# Patient Record
Sex: MALE | Race: WHITE | NOT HISPANIC OR LATINO | Employment: FULL TIME | URBAN - METROPOLITAN AREA
[De-identification: names, ages, dates, MRNs, and addresses within clinical notes are randomized per-mention and may not be internally consistent; named-entity substitution may affect disease eponyms.]

---

## 2017-11-29 ENCOUNTER — ALLSCRIPTS OFFICE VISIT (OUTPATIENT)
Dept: OTHER | Facility: OTHER | Age: 57
End: 2017-11-29

## 2017-11-30 NOTE — PROGRESS NOTES
Assessment  1  Basal cell carcinoma of left side of neck (173 41) (C44 41)   2  Seborrheic keratosis (702 19) (L82 1)   3  Screening for skin condition (V82 0) (Z13 89)   4  Multiple melanocytic nevi (216 9) (D22 9)    Plan     · Schedule Surgery Treatment  Procedure  Status: Complete  Done: [id number]    Discussion/Summary  Discussion Summary- [facility]:  Assessment #1: Basal cell constant of the left neck  Care Plan:  Area appears to have reappeared will plan on complete excision in the near future  Assessment #2: Seborrheic keratosis  Care Plan:  Patient reassured these are normal growths we acquire with age no treatment needed  Assessment #3: Nevi  Care Plan:  Reviewed the concept of ABCDE and ugly duckling nothing markedly atypical   Assessment #4: Screening for dermatologic disorders  Care Plan:  Nothing else of concern noted exam sun protection recommended no other suspicious lesions advised patient he should be seen every 6 months with this history of skin cancer  Chief Complaint  Chief Complaint Free Text Note Form: FULL BODY SKIN CANCER EXAM      History of Present Illness  HPI: 72-year-old male who had not seen since last year where we had excised a basal cell carcinoma on his left neck as well as curetted to other lesions on his back  Patient was told that the lesion on the neck was close to the margins a may recur patient notes that the area has been more irritated recently      Review of Systems  Complete [facility]at[facility] [address] [place] [address] [address] Patient:  Constitutional: Denies constitutional symptoms  Eyes: Denies eye symptoms  ENT:  denies ear symptoms, nasal symptoms, mouth or throat symptoms  Cardiovascular: Denies cardiovascular symptoms  Respiratory: Denies respiratory symptoms  Gastrointestinal: Denies gastrointestinal symptoms  Musculoskeletal: Denies musculoskeletal symptoms  Integumentary: Denies skin, hair and nail symptoms  Neurological: Denies neurologic symptoms  Psychiatric: Denies psychiatric symptoms  Endocrine: Denies endocrine symptoms  Hematologic/Lymphatic: Denies hematologic symptoms  Active Problems  1  Basal cell carcinoma of back (173 51) (C44 519)   2  Basal cell carcinoma of left shoulder (173 61) (C44 619)   3  Basal cell carcinoma of left side of neck (173 41) (C44 41)   4  Changing skin lesion (709 9) (L98 9)   5  Multiple melanocytic nevi (216 9) (D22 9)   6  Screening for skin condition (V82 0) (Z13 89)   7  Seborrheic keratosis (702 19) (L82 1)    Past Medical History  Past Medical History Reviewed- Derm:   The past medical history was reviewed  Surgical History  Surgical History Reviewed [facility]- Derm:   Surgical History reviewed      Family History  Family History Reviewed- Derm:   Family History was reviewed      Social History     · Former smoker (M18 15) (R09 210)  Social History Reviewed [facility]- Derm: The social history was reviewed      Current Meds   1  Albuterol Sulfate (2 5 MG/3ML) 0 083% Inhalation Nebulization Solution; Therapy: (Recorded:09Mar2016) to Recorded   2  ProAir  (90 Base) MCG/ACT Inhalation Aerosol Solution; Therapy: (Recorded:09Mar2016) to Recorded  Medication List Reviewed: The medication list was reviewed and updated today  Allergies    1  No Known Drug Allergies    Physical Exam   Constitutional  General appearance: Appears healthy and well developed  Lymphatic  No visible disturbance  Musculoskeletal  Digits and nails: No clubbing, cyanosis or edema  Cutaneous and nail exam normal    Skin  Scalp skin texture and hair distribution: Normal skin texture on scalp, normal hair distribution  Head: Normal turgor, no rashes, no lesions  Neck: Abnormal    Chest: Normal turgor, no rashes, no lesions  Abdomen: Normal turgor, no rashes, no lesions  Back: Normal turgor, no rashes, no lesions  Right upper extremity: Normal turgor, no rashes, no lesions     Left upper extremity: Normal turgor, no rashes, no lesions  Right lower extremity: Normal turgor, no rashes, no lesions  Left lower extremity: Normal turgor, no rashes, no lesions  Neuro/Psych  Alert and oriented x 3  Displays comfort and cooperation during encounterl  Affect is normal    Finding 2 areas are pearly macules noted at the left neck at the excision line of the previous excised basal cell carcinoma normal keratotic papules with greasy stuck on appearance nothing else atypical noted on exam       Future Appointments    Date/Time Provider Specialty Site   12/20/2017 03:00 PM Romayne Clifton, M D   Dermatology [facility]       Signatures   Electronically signed by : SHEKHAR Hurley ; Nov 29 2017  4:48PM EST                       (Author)

## 2017-12-20 ENCOUNTER — LAB REQUISITION (OUTPATIENT)
Dept: LAB | Facility: HOSPITAL | Age: 57
End: 2017-12-20
Payer: COMMERCIAL

## 2017-12-20 ENCOUNTER — ALLSCRIPTS OFFICE VISIT (OUTPATIENT)
Dept: OTHER | Facility: OTHER | Age: 57
End: 2017-12-20

## 2017-12-20 DIAGNOSIS — C44.41 BASAL CELL CARCINOMA OF SKIN OF SCALP AND NECK: ICD-10-CM

## 2017-12-20 PROCEDURE — 88305 TISSUE EXAM BY PATHOLOGIST: CPT | Performed by: DERMATOLOGY

## 2018-01-10 NOTE — RESULT NOTES
Message   apt made     Verified Results  (1) TISSUE EXAM [id number] 12:37PM Miriam Do Order Number: [id number]     Test Name Result Flag Reference   LAB AP CASE REPORT (Report)     Surgical Pathology Report             Case: [id number]                   Authorizing Provider: Ad Arnold MD     Collected:      03/09/2016 1237        Pathologist:      Samual Ormond, MD      Received:      03/10/2016 4999        Specimens:  A) - Skin, Other, left neck                                      B) - Skin, Other, left upper back   LAB AP FINAL DIAGNOSIS (Report)     A  Skin, left neck, shave biopsy:  - Ulcerated basal cell carcinoma, nodular type, extending to the   peripheral border and base of biopsy  B  Skin, left upper back, shave biopsy:  - Basal cell carcinoma, nodular and fibroepithelioma types with   infiltrative features, extending to the    peripheral border and base of biopsy  Interpretation performed at [facility], [address] [place] [address]  LAB AP SURGICAL ADDITIONAL INFORMATION (Report)     These tests were developed and their performance characteristics   determined by [address] [place] [address] Specialty [facility] or [address] [place] [address]  They may not be cleared or approved by the [place]  Food and   Drug Administration  The FDA has determined that such clearance or   approval is not necessary  These tests are used for clinical purposes  They should not be regarded as investigational or for research  This   laboratory has been approved by [place] [address], designated as a high-complexity   laboratory and is qualified to perform these tests  LAB AP GROSS DESCRIPTION (Report)     A  The specimen is received in formalin, labeled with the patient's name   and hospital number, and is designated skin shave left neck  The   specimen consists of a tan superficial shave of skin measuring 1 3 x 0 9 x   0 1 cm   The skin surface is hairbearing and exhibits a   keratotic/hemorrhagic papule measuring 0 9 x 0 7 x 0 1 cm which   multifocally extends to within 1 mm or less of the unoriented peripheral   margin  The margin is inked blue  The skin surface is inked red  The   specimen is trisected lengthwise  Entirely submitted  One cassette  B  The specimen is received in formalin, labeled with the patient's name   and hospital number, and is designated skin shave left upper back  The   specimen consists of a tan superficial shave of skin measuring 0 8 x 0 6 x   0 1 cm  The skin surface appears keratotic  The margin is inked blue  The   skin surface is inked red  The specimen is bisected lengthwise  Entirely   submitted  One cassette  Note: The estimated total formalin fixation time based upon information   provided by the submitting clinician and the standard processing schedule   is over 72 hours   [facility]   LAB AP CLINICAL INFORMATION      TW Order Number: [id number]  R/O BCC

## 2018-01-15 NOTE — RESULT NOTES
Message   biopsy received     Verified Results  (1) TISSUE EXAM 2016 04:49PM Kellie Zavala Order Number: [id number]     Test Name Result Flag Reference   LAB AP CASE REPORT (Report)     Surgical Pathology Report             Case: [id number]                   Authorizing Provider: Shavonne Martínez MD     Collected:      2016 1319        Pathologist:      Nathalie Stroud MD      Received:      04/15/2016 1206        Specimens:  A) - Skin, Other, Left neck                                      B) - Skin, Other, Left back   LAB AP FINAL DIAGNOSIS (Report)     A  Skin, left neck, excision:  - Scar and residual basal cell carcinoma (0 6 cm), nodular type with   superficial/multifocal features,    extending into reticular dermis   - No perineural or lymph-vascular invasion identified   - Resection margins (peripheral and deep) are negative for malignancy but   close  -- Invasive carcinoma within 0 1 mm of one peripheral margin  B  Skin, left back, curetting:  - Basal cell carcinoma with nodular and fibroepithelioma type features,   extending to base of biopsy  Interpretation performed at [facility], [address] [place] [address]  LAB AP SURGICAL ADDITIONAL INFORMATION (Report)     These tests were developed and their performance characteristics   determined by [address] [place] [address] Specialty [facility] or [facility]  They may not be cleared or approved by the [place]  Food and   Drug Administration  The FDA has determined that such clearance or   approval is not necessary  These tests are used for clinical purposes  They should not be regarded as investigational or for research  This   laboratory has been approved by CLIA 88, designated as a high-complexity   laboratory and is qualified to perform these tests  LAB AP GROSS DESCRIPTION (Report)     A  The specimen is received in formalin, labeled with the patient's name   and hospital number, and is designated left neck   The specimen consists   of a rubbery white tan ellipse of skin with attached underlying yellow-tan   tissue together measuring 2 7 x 1 2 x 0 6 cm in greatest dimension  The   skin surface exhibits a vague, white-tan focal papule measuring up to 0 6   cm in greatest dimensions, located 0 1 cm and the nearest peripheral   resected margin  The skin surface tips are inked red and resection margin   blue  Entirely submitted as follows:  1: Opposite tips  2-4: Midportion, sequentially submitted from one tip aspect to opposite   tip aspect  B  The specimen is received in formalin, labeled with the patient's name   and hospital number, and is designated left back  The specimen consists   of a single irregular white-tan tissue fragment measuring 0 5 x 0 25 x   0 15 cm in greatest dimension  The surface exhibits 2 dark brown   crusty-like friable appearing foci measuring from 0 1 up to 0 15 cm in   greatest dimensions, less than 0 1 cm from one another extending to the   peripheral resected surface  The resected margin is inked blue and skin   surface red  Entirely submitted  One cassette  Note: The estimated total formalin fixation time based upon information   provided by the submitting clinician and the standard processing schedule   is over 72 hours  [facility]   LAB AP CLINICAL INFORMATION      TW Order Number: [id number]  A   BCC check margins

## 2018-01-23 NOTE — MISCELLANEOUS
Message  Return to work or school:   Jada Sanchez is under my professional care  He was seen in my office on 12/20/2017   He is able to return to work on  12/21/2017      Weight Bearing Status: No Weight-Bearing  DR THA BAUER        Signatures   Electronically signed by : SHEKHAR Noguera ; Dec 22 2017  7:49AM EST                       (Author)

## 2018-02-26 ENCOUNTER — CLINICAL SUPPORT (OUTPATIENT)
Dept: DERMATOLOGY | Facility: CLINIC | Age: 58
End: 2018-02-26

## 2018-02-26 DIAGNOSIS — C44.41 BASAL CELL CARCINOMA OF LEFT SIDE OF NECK: Primary | ICD-10-CM

## 2018-02-26 PROCEDURE — 99024 POSTOP FOLLOW-UP VISIT: CPT | Performed by: DERMATOLOGY

## 2018-02-26 RX ORDER — ALBUTEROL SULFATE 2.5 MG/3ML
SOLUTION RESPIRATORY (INHALATION)
COMMUNITY

## 2018-02-26 RX ORDER — ALBUTEROL SULFATE 90 UG/1
AEROSOL, METERED RESPIRATORY (INHALATION)
COMMUNITY

## 2018-02-26 NOTE — PROGRESS NOTES
[address] [address] [facility]ERMAT[facility]  [address] [place] [address]     MRN: [id number] : 1960  Encounter: [id number]  Patient Information: Corina Crawford    Subjective:     60-year-old male presents for previously excised basal cell carcinoma left posterior neck with a remnant of a stitch still present     Objective: There were no vitals taken for this visit  Physical Exam:    General Appearance:    Alert, cooperative, no distress   Skin:   Suture noted removed with a  forceps     Assessment:     1  Basal cell carcinoma of left side of neck           Plan:    suture removed follow-up as previously scheduled      Prior to Admission medications    Medication Sig Start Date End Date Taking? Authorizing Provider   albuterol (2 5 mg/3 mL) 0 083 % nebulizer solution Inhale    Historical Provider, MD   albuterol (PROAIR HFA) 90 mcg/act inhaler Inhale    Historical Provider, MD     No Known Allergies    Clarice Alonzo MD  2018,3:19 PM    Portions of the record may have been created with voice recognition software   Occasional wrong word or "sound a like" substitutions may have occurred due to the inherent limitations of voice recognition software   Read the chart carefully and recognize, using context, where substitutions have occurred

## 2021-08-04 ENCOUNTER — OFFICE VISIT (OUTPATIENT)
Dept: URGENT CARE | Facility: CLINIC | Age: 61
End: 2021-08-04
Payer: COMMERCIAL

## 2021-08-04 VITALS
BODY MASS INDEX: 45.43 KG/M2 | DIASTOLIC BLOOD PRESSURE: 86 MMHG | WEIGHT: 315 LBS | SYSTOLIC BLOOD PRESSURE: 148 MMHG | RESPIRATION RATE: 20 BRPM | TEMPERATURE: 98.1 F | OXYGEN SATURATION: 92 % | HEART RATE: 82 BPM

## 2021-08-04 DIAGNOSIS — H60.392 INFECTION OF LEFT EARLOBE: Primary | ICD-10-CM

## 2021-08-04 PROCEDURE — 99213 OFFICE O/P EST LOW 20 MIN: CPT | Performed by: PHYSICIAN ASSISTANT

## 2021-08-04 RX ORDER — CEPHALEXIN 500 MG/1
500 CAPSULE ORAL EVERY 6 HOURS SCHEDULED
Qty: 28 CAPSULE | Refills: 0 | Status: SHIPPED | OUTPATIENT
Start: 2021-08-04 | End: 2021-08-11

## 2021-08-04 RX ORDER — BUDESONIDE AND FORMOTEROL FUMARATE DIHYDRATE 160; 4.5 UG/1; UG/1
2 AEROSOL RESPIRATORY (INHALATION) 2 TIMES DAILY
COMMUNITY
Start: 2021-05-28

## 2021-08-04 NOTE — PATIENT INSTRUCTIONS
Hydration and rest  Tylenol and motrin for pain  Start cephalexin  Do not scratch or pick at wound  Warm compresses  PCP follow up

## 2021-08-04 NOTE — PROGRESS NOTES
[facility]        NAME: Irish Holm is a 62 y o  male  : 1960    MRN: [id number]  DATE: 2021  TIME: 6:39 PM    Assessment and Plan   Infection of left earlobe [H60 392]  1  Infection of left earlobe  cephalexin (KEFLEX) 500 mg capsule         Patient Instructions     Patient Instructions   Hydration and rest  Tylenol and motrin for pain  Start cephalexin  Do not scratch or pick at wound  Warm compresses  PCP follow up  **Portions of the record may have been created with voice recognition software  Occasional wrong word or "sound a like" substitutions may have occurred due to the inherent limitations of voice recognition software  Read the chart carefully and recognize, using context, where substitutions have occurred  **     Chief Complaint     Chief Complaint   Patient presents with    Earache     Pt has 2 scabs on L ear lobe, 1 on outisde of ear and another small on inner ear  Noticed yesterday  States they were bleeding yesterday  History of Present Illness       81-year-old male presents clinic with complaints of left earlobe pain, swelling and bleeding x1 day  Patient has 2 scabs on his ear which she believes is from sunburn  They have become red swollen and tender to the touch  Denies any fever  He reports some drainage from the ear lobe  Review of Systems     Review of Systems   Constitutional: Negative for chills and fever  HENT: Negative for tinnitus  Respiratory: Negative for shortness of breath  Cardiovascular: Negative for chest pain  Musculoskeletal: Negative for myalgias  Skin: Positive for wound  Neurological: Negative for dizziness, weakness and numbness           Current Medications     Current Outpatient Medications:     albuterol (2 5 mg/3 mL) 0 083 % nebulizer solution, Inhale, Disp: , Rfl:     albuterol (PROAIR HFA) 90 mcg/act inhaler, Inhale, Disp: , Rfl:     cephalexin (KEFLEX) 500 mg capsule, Take 1 capsule (500 mg total)

## 2024-01-01 ENCOUNTER — HOSPITAL ENCOUNTER (EMERGENCY)
Facility: HOSPITAL | Age: 64
Discharge: HOME | End: 2024-01-01
Attending: EMERGENCY MEDICINE | Admitting: EMERGENCY MEDICINE
Payer: COMMERCIAL

## 2024-01-01 VITALS
OXYGEN SATURATION: 92 % | RESPIRATION RATE: 18 BRPM | HEART RATE: 98 BPM | TEMPERATURE: 98.2 F | SYSTOLIC BLOOD PRESSURE: 170 MMHG | DIASTOLIC BLOOD PRESSURE: 83 MMHG

## 2024-01-01 DIAGNOSIS — S61.210A LACERATION OF RIGHT INDEX FINGER WITHOUT FOREIGN BODY WITHOUT DAMAGE TO NAIL, INITIAL ENCOUNTER: Primary | ICD-10-CM

## 2024-01-01 PROCEDURE — 90471 IMMUNIZATION ADMIN: CPT

## 2024-01-01 PROCEDURE — 90715 TDAP VACCINE 7 YRS/> IM: CPT | Performed by: EMERGENCY MEDICINE

## 2024-01-01 PROCEDURE — 99284 EMERGENCY DEPT VISIT MOD MDM: CPT | Performed by: EMERGENCY MEDICINE

## 2024-01-01 PROCEDURE — 99282 EMERGENCY DEPT VISIT SF MDM: CPT

## 2024-01-01 PROCEDURE — 12001 RPR S/N/AX/GEN/TRNK 2.5CM/<: CPT | Performed by: EMERGENCY MEDICINE

## 2024-01-01 RX ORDER — LIDOCAINE HYDROCHLORIDE 10 MG/ML
10 INJECTION, SOLUTION EPIDURAL; INFILTRATION; INTRACAUDAL; PERINEURAL ONCE
Status: COMPLETED | OUTPATIENT
Start: 2024-01-01 | End: 2024-01-01

## 2024-01-01 RX ADMIN — LIDOCAINE HYDROCHLORIDE 10 ML: 10 INJECTION, SOLUTION EPIDURAL; INFILTRATION; INTRACAUDAL at 16:52

## 2024-01-01 RX ADMIN — TETANUS TOXOID, REDUCED DIPHTHERIA TOXOID AND ACELLULAR PERTUSSIS VACCINE, ADSORBED 0.5 ML: 5; 2.5; 8; 8; 2.5 SUSPENSION INTRAMUSCULAR at 16:53

## 2024-01-01 NOTE — Clinical Note
Armando Archer was seen and treated in our emergency department on 1/1/2024.                Diagnosis: Finger laceration    Armando  may return to work on return date.    He may return on this date: 01/08/2024         If you have any questions or concerns, please don't hesitate to call.      aMtthias Orantes MD    ______________________________           _______________          _______________  Hospital Representative                              Date                                Time

## 2024-01-01 NOTE — DISCHARGE INSTRUCTIONS
Please follow up PCP.  Recommend returning in 10 to 14 days for suture removal.  Recommend cleaning with soap and water 3 times daily.  Recommend tylenol 650 mg and ibuprofen 600 mg every 6 hours as needed for pain. Please return for severe chest pain, significant shortness of breath, severely worsening symptoms, or any other concerning signs or symptoms. Please refer to the following documents for additional instructions and return precautions.

## 2024-01-02 NOTE — ED PROVIDER NOTES
History  Chief Complaint   Patient presents with    Finger Laceration     Pt cut R index finger with knife while opening a bag of corn, pt states bleeding has slowed with pressure. Pt does take BT, unsure when last tetanus shot was      63-year-old male history of COPD presenting with finger laceration.  Patient reports trying to open a bag of corn when he accidentally cut his right index finger.  Bleeding controlled at this time.  Unsure of last tetanus.  Denies any other pain or injury.  Denies any other complaints.  Chart reviewed.    Past Medical History:  No date: COPD (chronic obstructive pulmonary disease) ([facility])  Family History: non-contributory  Social History          Prior to Admission Medications   Prescriptions Last Dose Informant Patient Reported? Taking?   Symbicort 160-4.5 MCG/ACT inhaler   Yes Yes   Sig: Inhale 2 puffs 2 (two) times a day   albuterol (2.5 mg/3 mL) 0.083 % nebulizer solution   Yes Yes   Sig: Inhale   albuterol (PROAIR HFA) 90 mcg/act inhaler   Yes Yes   Sig: Inhale      Facility-Administered Medications: None       Past Medical History:   Diagnosis Date    COPD (chronic obstructive pulmonary disease) ([facility])        History reviewed. No pertinent surgical history.    History reviewed. No pertinent family history.  I have reviewed and agree with the history as documented.    E-Cigarette/Vaping     E-Cigarette/Vaping Substances     Social History     Tobacco Use    Smoking status: Former    Smokeless tobacco: Never       Review of Systems   Constitutional:  Negative for appetite change, chills, diaphoresis, fever and unexpected weight change.   HENT:  Negative for congestion and rhinorrhea.    Eyes:  Negative for photophobia and visual disturbance.   Respiratory:  Negative for cough, chest tightness and shortness of breath.    Cardiovascular:  Negative for chest pain, palpitations and leg swelling.   Gastrointestinal:  Negative for abdominal distention, abdominal pain, blood in stool,  constipation, diarrhea, nausea and vomiting.   Genitourinary:  Negative for dysuria and hematuria.   Musculoskeletal:  Negative for back pain, joint swelling, neck pain and neck stiffness.   Skin:  Positive for wound. Negative for color change, pallor and rash.   Neurological:  Negative for dizziness, syncope, weakness, light-headedness and headaches.   Psychiatric/Behavioral:  Negative for agitation.    All other systems reviewed and are negative.      Physical Exam  Physical Exam  Vitals and nursing note reviewed.   Constitutional:       General: He is not in acute distress.     Appearance: Normal appearance. He is well-developed. He is not ill-appearing, toxic-appearing or diaphoretic.   HENT:      Head: Normocephalic and atraumatic.      Nose: Nose normal. No congestion or rhinorrhea.      Mouth/Throat:      Mouth: Mucous membranes are moist.      Pharynx: Oropharynx is clear. No oropharyngeal exudate or posterior oropharyngeal erythema.   Eyes:      General: No scleral icterus.        Right eye: No discharge.         Left eye: No discharge.      Extraocular Movements: Extraocular movements intact.      Conjunctiva/sclera: Conjunctivae normal.      Pupils: Pupils are equal, round, and reactive to light.   Neck:      Vascular: No JVD.      Trachea: No tracheal deviation.      Comments: Supple. Normal range of motion.   Cardiovascular:      Rate and Rhythm: Normal rate and regular rhythm.      Heart sounds: Normal heart sounds. No murmur heard.     No friction rub. No gallop.      Comments: Normal rate and regular rhythm  Pulmonary:      Effort: Pulmonary effort is normal. No respiratory distress.      Breath sounds: Normal breath sounds. No stridor. No wheezing or rales.      Comments: Clear to auscultation bilaterally  Chest:      Chest wall: No tenderness.   Abdominal:      General: Bowel sounds are normal. There is no distension.      Palpations: Abdomen is soft.      Tenderness: There is no abdominal  tenderness. There is no right CVA tenderness, left CVA tenderness, guarding or rebound.      Comments: Soft, nontender, nondistended.  Normal bowel sounds throughout   Musculoskeletal:         General: No swelling, tenderness, deformity or signs of injury. Normal range of motion.      Cervical back: Normal range of motion and neck supple. No rigidity. No muscular tenderness.      Right lower leg: No edema.      Left lower leg: No edema.   Lymphadenopathy:      Cervical: No cervical adenopathy.   Skin:     General: Skin is warm and dry.      Coloration: Skin is not pale.      Findings: Lesion present. No erythema or rash.      Comments: Approximately 1.5 cm laceration to right index finger with gapping.  Sensation intact.  Motor intact.  Normal cap refill   Neurological:      General: No focal deficit present.      Mental Status: He is alert. Mental status is at baseline.      Sensory: No sensory deficit.      Motor: No weakness or abnormal muscle tone.      Coordination: Coordination normal.      Gait: Gait normal.      Comments: Alert.  Strength and sensation grossly intact.  Ambulatory without difficulty at baseline.    Psychiatric:         Behavior: Behavior normal.         Thought Content: Thought content normal.         Vital Signs  ED Triage Vitals [01/01/24 1605]   Temperature Pulse Respirations Blood Pressure SpO2   98.2 °F (36.8 °C) 98 18 170/83 92 %      Temp Source Heart Rate Source Patient Position - Orthostatic VS BP Location FiO2 (%)   Tympanic Monitor Sitting Left arm --      Pain Score       --           Vitals:    01/01/24 1605   BP: 170/83   Pulse: 98   Patient Position - Orthostatic VS: Sitting         Visual Acuity      ED Medications  Medications   lidocaine (PF) (XYLOCAINE-MPF) 1 % injection 10 mL (10 mL Infiltration Given 1/1/24 1652)   tetanus-diphtheria-acellular pertussis (BOOSTRIX) IM injection 0.5 mL (0.5 mL Intramuscular Given 1/1/24 1653)       Diagnostic Studies  Results Reviewed        "None                   No orders to display              Procedures  Digital Block    Date/Time: 1/1/2024 5:15 PM    Performed by: Matthias Orantes MD  Authorized by: Matthias Orantes MD    Consent:     Consent obtained:  Verbal    Consent given by:  Patient    Risks discussed:  Allergic reaction, bleeding, intravascular injection, infection, nerve damage, pain, unsuccessful block and swelling    Alternatives discussed:  No treatment  Indications:     Indications:  Procedural anesthesia  Location:     Block location:  Finger    Finger blocked:  R index finger  Pre-procedure details:     Neurovascular status: intact      Skin preparation:  Alcohol  Procedure details (see MAR for exact dosages):     Needle gauge:  27 G    Anesthetic injected:  Lidocaine 1% w/o epi    Technique:  Four-sided ring block    Injection procedure:  Anatomic landmarks identified, anatomic landmarks palpated, introduced needle, incremental injection and negative aspiration for blood  Post-procedure details:     Outcome:  Anesthesia achieved    Patient tolerance of procedure:  Tolerated well, no immediate complications  Universal Protocol:  Consent: Verbal consent obtained.  Risks and benefits: risks, benefits and alternatives were discussed  Consent given by: patient  Time out: Immediately prior to procedure a \"time out\" was called to verify the correct patient, procedure, equipment, support staff and site/side marked as required.  Timeout called at: 1/1/2024 5:15 PM.  Patient understanding: patient states understanding of the procedure being performed  Patient consent: the patient's understanding of the procedure matches consent given  Procedure consent: procedure consent matches procedure scheduled  Relevant documents: relevant documents present and verified  Test results: test results available and properly labeled  Site marked: the operative site was marked  Radiology Images displayed and confirmed. If images not available, report reviewed: " imaging studies available  Required items: required blood products, implants, devices, and special equipment available  Patient identity confirmed: verbally with patient and arm band  Laceration repair    Date/Time: 1/1/2024 5:15 PM    Performed by: Matthias Orantes MD  Authorized by: Matthias Orantes MD  Body area: upper extremity  Location details: right index finger  Laceration length: 1.5 cm  Tendon involvement: none  Nerve involvement: none  Vascular damage: no  Anesthesia: digital block    Anesthesia:  Local Anesthetic: lidocaine 1% without epinephrine  Anesthetic total: 10 mL    Sedation:  Patient sedated: no      Wound Dehiscence:  Superficial Wound Dehiscence: simple closure      Procedure Details:  Preparation: Patient was prepped and draped in the usual sterile fashion.  Irrigation solution: saline  Irrigation method: jet lavage  Amount of cleaning: standard  Debridement: none  Degree of undermining: none  Wound skin closure material used: 5-0 ethilon.  Number of sutures: 4  Technique: simple  Approximation: close  Approximation difficulty: simple  Dressing: 4x4 sterile gauze and gauze roll  Patient tolerance: patient tolerated the procedure well with no immediate complications               ED Course                               SBIRT 22yo+      Flowsheet Row Most Recent Value   Initial Alcohol Screen: US AUDIT-C     1. How often do you have a drink containing alcohol? 0 Filed at: 01/01/2024 1608   2. How many drinks containing alcohol do you have on a typical day you are drinking?  0 Filed at: 01/01/2024 1608   3a. Male UNDER 65: How often do you have five or more drinks on one occasion? 0 Filed at: 01/01/2024 1608   3b. FEMALE Any Age, or MALE 65+: How often do you have 4 or more drinks on one occassion? 0 Filed at: 01/01/2024 1608   Audit-C Score 0 Filed at: 01/01/2024 1608   ZAHRA: How many times in the past year have you...    Used an illegal drug or used a prescription medication for non-medical reasons?  Never Filed at: 01/01/2024 1608                      Medical Decision Making  63-year-old male history of COPD presenting with finger laceration.  Gapping finger laceration.  Neurovascular intact.  Plan for repair.  Digital block with anesthesia achieved.  Wound irrigated copiously.  Repaired with 4 sutures.  Suture precautions. Discussed results and recommendations. Advised follow up PCP. Medication recommendations. Given instructions and return precautions. Patient/family at bedside acknowledged understanding of all written and verbal instructions and return precautions. Discharged.     Risk  Prescription drug management.             Disposition  Final diagnoses:   Laceration of right index finger without foreign body without damage to nail, initial encounter     Time reflects when diagnosis was documented in both MDM as applicable and the Disposition within this note       Time User Action Codes Description Comment    1/1/2024  5:37 PM Matthias Orantes Add [S61.210A] Laceration of right index finger without foreign body without damage to nail, initial encounter           ED Disposition       ED Disposition   Discharge    Condition   Stable    Date/Time   Mon Jan 1, 2024 5987    Comment   Armando Archer discharge to home/self care.                   Follow-up Information       Follow up With Specialties Details Why Contact Info    Randal Mari MD Internal Medicine Schedule an appointment as soon as possible for a visit in 1 week  [address] [place] [id number]  [phone]              Discharge Medication List as of 1/1/2024  5:38 PM        CONTINUE these medications which have NOT CHANGED    Details   albuterol (2.5 mg/3 mL) 0.083 % nebulizer solution Inhale, Historical Med      albuterol (PROAIR HFA) 90 mcg/act inhaler Inhale, Historical Med      Symbicort 160-4.5 MCG/ACT inhaler Inhale 2 puffs 2 (two) times a day, Starting Fri 5/28/2021, Historical Med             No discharge  procedures on file.    PDMP Review       None            ED Provider  Electronically Signed by             Matthias Orantes MD  01/01/24 5752

## 2025-02-19 ENCOUNTER — APPOINTMENT (EMERGENCY)
Dept: RADIOLOGY | Facility: HOSPITAL | Age: 65
DRG: 191 | End: 2025-02-19
Payer: COMMERCIAL

## 2025-02-19 ENCOUNTER — HOSPITAL ENCOUNTER (INPATIENT)
Facility: HOSPITAL | Age: 65
LOS: 3 days | Discharge: HOME | DRG: 191 | End: 2025-02-24
Attending: STUDENT IN AN ORGANIZED HEALTH CARE EDUCATION/TRAINING PROGRAM | Admitting: INTERNAL MEDICINE
Payer: COMMERCIAL

## 2025-02-19 ENCOUNTER — APPOINTMENT (EMERGENCY)
Dept: CT IMAGING | Facility: HOSPITAL | Age: 65
DRG: 191 | End: 2025-02-19
Payer: COMMERCIAL

## 2025-02-19 DIAGNOSIS — I42.9 CARDIOMYOPATHY (HCC): ICD-10-CM

## 2025-02-19 DIAGNOSIS — J06.9 URI (UPPER RESPIRATORY INFECTION): ICD-10-CM

## 2025-02-19 DIAGNOSIS — R06.02 SHORTNESS OF BREATH: ICD-10-CM

## 2025-02-19 DIAGNOSIS — R79.89 ELEVATED TROPONIN: ICD-10-CM

## 2025-02-19 DIAGNOSIS — J44.1 COPD EXACERBATION (HCC): Primary | ICD-10-CM

## 2025-02-19 DIAGNOSIS — I42.9 CARDIOMYOPATHY, UNSPECIFIED TYPE (HCC): ICD-10-CM

## 2025-02-19 LAB
2HR DELTA HS TROPONIN: 7 NG/L
4HR DELTA HS TROPONIN: 12 NG/L
ALBUMIN SERPL BCG-MCNC: 4.2 G/DL (ref 3.5–5)
ALP SERPL-CCNC: 55 U/L (ref 34–104)
ALT SERPL W P-5'-P-CCNC: 52 U/L (ref 7–52)
ANION GAP SERPL CALCULATED.3IONS-SCNC: 7 MMOL/L (ref 4–13)
AST SERPL W P-5'-P-CCNC: 33 U/L (ref 13–39)
BASOPHILS # BLD AUTO: 0.05 THOUSANDS/ÂΜL (ref 0–0.1)
BASOPHILS NFR BLD AUTO: 0 % (ref 0–1)
BILIRUB SERPL-MCNC: 0.37 MG/DL (ref 0.2–1)
BNP SERPL-MCNC: 112 PG/ML (ref 0–100)
BUN SERPL-MCNC: 23 MG/DL (ref 5–25)
CALCIUM SERPL-MCNC: 9 MG/DL (ref 8.4–10.2)
CARDIAC TROPONIN I PNL SERPL HS: 52 NG/L (ref ?–50)
CARDIAC TROPONIN I PNL SERPL HS: 59 NG/L (ref ?–50)
CARDIAC TROPONIN I PNL SERPL HS: 64 NG/L (ref ?–50)
CHLORIDE SERPL-SCNC: 105 MMOL/L (ref 96–108)
CO2 SERPL-SCNC: 27 MMOL/L (ref 21–32)
CREAT SERPL-MCNC: 0.78 MG/DL (ref 0.6–1.3)
EOSINOPHIL # BLD AUTO: 0.07 THOUSAND/ÂΜL (ref 0–0.61)
EOSINOPHIL NFR BLD AUTO: 1 % (ref 0–6)
ERYTHROCYTE [DISTWIDTH] IN BLOOD BY AUTOMATED COUNT: 15 % (ref 11.6–15.1)
GFR SERPL CREATININE-BSD FRML MDRD: 95 ML/MIN/1.73SQ M
GLUCOSE SERPL-MCNC: 145 MG/DL (ref 65–140)
HCT VFR BLD AUTO: 44.3 % (ref 36.5–49.3)
HGB BLD-MCNC: 14.4 G/DL (ref 12–17)
IMM GRANULOCYTES # BLD AUTO: 0.07 THOUSAND/UL (ref 0–0.2)
IMM GRANULOCYTES NFR BLD AUTO: 1 % (ref 0–2)
LYMPHOCYTES # BLD AUTO: 0.99 THOUSANDS/ÂΜL (ref 0.6–4.47)
LYMPHOCYTES NFR BLD AUTO: 9 % (ref 14–44)
MCH RBC QN AUTO: 29.4 PG (ref 26.8–34.3)
MCHC RBC AUTO-ENTMCNC: 32.5 G/DL (ref 31.4–37.4)
MCV RBC AUTO: 90 FL (ref 82–98)
MONOCYTES # BLD AUTO: 0.73 THOUSAND/ÂΜL (ref 0.17–1.22)
MONOCYTES NFR BLD AUTO: 6 % (ref 4–12)
NEUTROPHILS # BLD AUTO: 9.77 THOUSANDS/ÂΜL (ref 1.85–7.62)
NEUTS SEG NFR BLD AUTO: 83 % (ref 43–75)
NRBC BLD AUTO-RTO: 0 /100 WBCS
PLATELET # BLD AUTO: 165 THOUSANDS/UL (ref 149–390)
PMV BLD AUTO: 9.8 FL (ref 8.9–12.7)
POTASSIUM SERPL-SCNC: 4.1 MMOL/L (ref 3.5–5.3)
PROT SERPL-MCNC: 6.9 G/DL (ref 6.4–8.4)
RBC # BLD AUTO: 4.9 MILLION/UL (ref 3.88–5.62)
SODIUM SERPL-SCNC: 139 MMOL/L (ref 135–147)
WBC # BLD AUTO: 11.68 THOUSAND/UL (ref 4.31–10.16)

## 2025-02-19 PROCEDURE — 93005 ELECTROCARDIOGRAM TRACING: CPT

## 2025-02-19 PROCEDURE — 71045 X-RAY EXAM CHEST 1 VIEW: CPT

## 2025-02-19 PROCEDURE — 36415 COLL VENOUS BLD VENIPUNCTURE: CPT | Performed by: STUDENT IN AN ORGANIZED HEALTH CARE EDUCATION/TRAINING PROGRAM

## 2025-02-19 PROCEDURE — 94640 AIRWAY INHALATION TREATMENT: CPT

## 2025-02-19 PROCEDURE — 99285 EMERGENCY DEPT VISIT HI MDM: CPT

## 2025-02-19 PROCEDURE — 96374 THER/PROPH/DIAG INJ IV PUSH: CPT

## 2025-02-19 PROCEDURE — 99285 EMERGENCY DEPT VISIT HI MDM: CPT | Performed by: STUDENT IN AN ORGANIZED HEALTH CARE EDUCATION/TRAINING PROGRAM

## 2025-02-19 PROCEDURE — 83880 ASSAY OF NATRIURETIC PEPTIDE: CPT | Performed by: STUDENT IN AN ORGANIZED HEALTH CARE EDUCATION/TRAINING PROGRAM

## 2025-02-19 PROCEDURE — 85025 COMPLETE CBC W/AUTO DIFF WBC: CPT | Performed by: STUDENT IN AN ORGANIZED HEALTH CARE EDUCATION/TRAINING PROGRAM

## 2025-02-19 PROCEDURE — 71275 CT ANGIOGRAPHY CHEST: CPT

## 2025-02-19 PROCEDURE — 80053 COMPREHEN METABOLIC PANEL: CPT | Performed by: STUDENT IN AN ORGANIZED HEALTH CARE EDUCATION/TRAINING PROGRAM

## 2025-02-19 PROCEDURE — 84484 ASSAY OF TROPONIN QUANT: CPT | Performed by: STUDENT IN AN ORGANIZED HEALTH CARE EDUCATION/TRAINING PROGRAM

## 2025-02-19 RX ORDER — IPRATROPIUM BROMIDE AND ALBUTEROL SULFATE 2.5; .5 MG/3ML; MG/3ML
3 SOLUTION RESPIRATORY (INHALATION) ONCE
Status: COMPLETED | OUTPATIENT
Start: 2025-02-19 | End: 2025-02-19

## 2025-02-19 RX ORDER — ACETAMINOPHEN 325 MG/1
650 TABLET ORAL EVERY 6 HOURS PRN
Status: DISCONTINUED | OUTPATIENT
Start: 2025-02-19 | End: 2025-02-24 | Stop reason: HOSPADM

## 2025-02-19 RX ORDER — HYDRALAZINE HYDROCHLORIDE 10 MG/1
10 TABLET, FILM COATED ORAL ONCE
Status: COMPLETED | OUTPATIENT
Start: 2025-02-19 | End: 2025-02-19

## 2025-02-19 RX ORDER — SODIUM CHLORIDE FOR INHALATION 3 %
4 VIAL, NEBULIZER (ML) INHALATION ONCE
Status: COMPLETED | OUTPATIENT
Start: 2025-02-19 | End: 2025-02-19

## 2025-02-19 RX ORDER — METHYLPREDNISOLONE SODIUM SUCCINATE 125 MG/2ML
80 INJECTION, POWDER, LYOPHILIZED, FOR SOLUTION INTRAMUSCULAR; INTRAVENOUS ONCE
Status: COMPLETED | OUTPATIENT
Start: 2025-02-19 | End: 2025-02-19

## 2025-02-19 RX ORDER — ASPIRIN 325 MG
325 TABLET ORAL ONCE
Status: COMPLETED | OUTPATIENT
Start: 2025-02-19 | End: 2025-02-19

## 2025-02-19 RX ADMIN — IOHEXOL 100 ML: 350 INJECTION, SOLUTION INTRAVENOUS at 18:13

## 2025-02-19 RX ADMIN — IPRATROPIUM BROMIDE AND ALBUTEROL SULFATE 3 ML: 2.5; .5 SOLUTION RESPIRATORY (INHALATION) at 16:55

## 2025-02-19 RX ADMIN — IPRATROPIUM BROMIDE AND ALBUTEROL SULFATE 3 ML: 2.5; .5 SOLUTION RESPIRATORY (INHALATION) at 21:05

## 2025-02-19 RX ADMIN — METHYLPREDNISOLONE SODIUM SUCCINATE 80 MG: 125 INJECTION, POWDER, FOR SOLUTION INTRAMUSCULAR; INTRAVENOUS at 20:35

## 2025-02-19 RX ADMIN — HYDRALAZINE HYDROCHLORIDE 10 MG: 10 TABLET ORAL at 23:57

## 2025-02-19 RX ADMIN — SODIUM CHLORIDE SOLN NEBU 3% 4 ML: 3 NEBU SOLN at 16:55

## 2025-02-19 RX ADMIN — ASPIRIN 325 MG ORAL TABLET 325 MG: 325 PILL ORAL at 20:34

## 2025-02-19 RX ADMIN — SODIUM CHLORIDE SOLN NEBU 3% 4 ML: 3 NEBU SOLN at 21:05

## 2025-02-19 NOTE — ED PROVIDER NOTES
ED Disposition       None          Assessment & Plan   {Hyperlinks  Risk Stratification - NIHSS - HEART SCORE - Fill out sepsis note and make sure you call [id number] if severe or septic shock:[id number]}    Medical Decision Making  Amount and/or Complexity of Data Reviewed  Labs: ordered.  Radiology: ordered.    Risk  Prescription drug management.             Medications   ipratropium-albuterol (DUO-NEB) 0.5-2.5 mg/3 mL inhalation solution 3 mL (has no administration in time range)   sodium chloride 3 % inhalation solution 4 mL (has no administration in time range)       ED Risk Strat Scores                                                History of Present Illness   {Hyperlinks  History (Med, Surg, Fam, Social) - Current Medications - Allergies  :[id number]}    Chief Complaint   Patient presents with    Shortness of Breath     Hx copd, uses nebs at home, dyspnea with exertion.          Past Medical History:   Diagnosis Date    COPD (chronic obstructive pulmonary disease) (HCC)       No past surgical history on file.   No family history on file.   Social History     Tobacco Use    Smoking status: Former    Smokeless tobacco: Never      E-Cigarette/Vaping      E-Cigarette/Vaping Substances      I have reviewed and agree with the history as documented.     HPI    Patient is a 65-year-old male presenting to the emergency department for shortness of breath and difficulty breathing.  Patient said he is on a second round of antibiotics treating for a COPD exacerbation.  Reports no fevers or chills.  Does report persistent coughing and inability to cough up with concern of mucous plugging.  He has been using his nebs as prescribed.  No noted leg swelling.  No current chest discomfort.  Reports difficulty ambulating secondary to shortness of breath.        Review of Systems   Constitutional:  Negative for chills and fever.   HENT:  Negative for ear pain and sore throat.    Eyes:  Negative for pain and visual disturbance.    Respiratory:  Positive for cough and shortness of breath.    Cardiovascular:  Negative for chest pain and palpitations.   Gastrointestinal:  Negative for abdominal pain and vomiting.   Genitourinary:  Negative for dysuria and hematuria.   Musculoskeletal:  Negative for arthralgias and back pain.   Skin:  Negative for color change and rash.   Neurological:  Negative for seizures and syncope.   All other systems reviewed and are negative.          Objective   {Hyperlinks  Historical Vitals - Historical Labs - Chart Review/Microbiology - Last Echo - Code Status  :[id number]}    ED Triage Vitals [02/19/25 1601]   Temperature Pulse Blood Pressure Respirations SpO2 Patient Position - Orthostatic VS   98.2 °F (36.8 °C) (!) 118 (!) 196/91 18 93 % Sitting      Temp Source Heart Rate Source BP Location FiO2 (%) Pain Score    Temporal Monitor Left arm -- --      Vitals      Date and Time Temp Pulse SpO2 Resp BP Pain Score FACES Pain Rating User   02/19/25 1601 98.2 °F (36.8 °C) 118 93 % 18 196/91 -- -- KW            Physical Exam  Vitals and nursing note reviewed.   Constitutional:       General: He is not in acute distress.     Appearance: He is well-developed.   HENT:      Head: Normocephalic and atraumatic.   Eyes:      Conjunctiva/sclera: Conjunctivae normal.   Cardiovascular:      Rate and Rhythm: Regular rhythm. Tachycardia present.      Heart sounds: No murmur heard.  Pulmonary:      Effort: Pulmonary effort is normal. No respiratory distress.      Breath sounds: Wheezing present.   Abdominal:      Palpations: Abdomen is soft.      Tenderness: There is no abdominal tenderness.   Musculoskeletal:         General: No swelling.      Cervical back: Neck supple.   Skin:     General: Skin is warm and dry.      Capillary Refill: Capillary refill takes less than 2 seconds.   Neurological:      General: No focal deficit present.      Mental Status: He is alert and oriented to person, place, and time.   Psychiatric:          Mood and Affect: Mood normal.         Results Reviewed       Procedure Component Value Units Date/Time    CBC and differential [[id number]]     Lab Status: No result Specimen: Blood     Comprehensive metabolic panel [[id number]]     Lab Status: No result Specimen: Blood     B-Type Natriuretic Peptide(BNP) [[id number]]     Lab Status: No result Specimen: Blood     HS Troponin 0hr (reflex protocol) [[id number]]     Lab Status: No result Specimen: Blood             XR chest 1 view portable    (Results Pending)       Procedures    ED Medication and Procedure Management   Prior to Admission Medications   Prescriptions Last Dose Informant Patient Reported? Taking?   Symbicort 160-4.5 MCG/ACT inhaler   Yes No   Sig: Inhale 2 puffs 2 (two) times a day   albuterol (2.5 mg/3 mL) 0.083 % nebulizer solution   Yes No   Sig: Inhale   albuterol (PROAIR HFA) 90 mcg/act inhaler   Yes No   Sig: Inhale      Facility-Administered Medications: None     Patient's Medications   Discharge Prescriptions    No medications on file     No discharge procedures on file.  ED SEPSIS DOCUMENTATION            02/23/25 1952 97.5 °F (36.4 °C) 86 91 % -- 163/97 -- -- DII   02/23/25 1518 -- -- -- 18 -- -- -- BL   02/23/25 1518 97.2 °F (36.2 °C) 90 92 % -- 157/90 -- -- DII   02/23/25 1333 -- 91 92 % -- -- -- -- ES   02/23/25 1014 -- -- -- -- -- No Pain -- EO   02/23/25 0747 -- 79 94 % -- -- -- -- ES   02/23/25 0728 -- 85 92 % 16 -- -- -- ES   02/23/25 0714 97.5 °F (36.4 °C) -- -- 16 157/91 -- -- DII   02/23/25 0713 97.5 °F (36.4 °C) 85 92 % 16 157/91 -- -- DII   02/23/25 0236 97.2 °F (36.2 °C) 85 92 % -- 162/91 -- -- DII   02/22/25 2300 -- -- -- 19 -- -- -- NB   02/22/25 2300 97.7 °F (36.5 °C) 81 92 % -- 133/73 -- -- DII   02/22/25 1957 -- -- -- -- -- No Pain -- NB   02/22/25 1957 -- 93 94 % -- 155/84 -- -- DII   02/22/25 1938 -- -- 93 % -- -- -- -- CB   02/22/25 1433 97.5 °F (36.4 °C) 98 91 % 16 151/84 -- -- DII   02/22/25 1345 -- 104 93 % -- -- -- -- ES   02/22/25 1327 -- 101 92 % 16 -- -- -- ES   02/22/25 1039 -- -- -- -- -- No Pain -- EO   02/22/25 0747 -- 89 93 % -- -- -- -- ES   02/22/25 0725 -- 97 92 % 16 -- -- -- ES   02/22/25 0724 97.4 °F (36.3 °C) 81 94 % 16 153/87 -- -- DII   02/21/25 2257 -- -- -- -- -- No Pain -- NB   02/21/25 2257 97.3 °F (36.3 °C) 77 95 % -- 154/87 -- -- DII   02/21/25 1952 -- -- 94 % -- -- -- -- CB   02/21/25 1946 97.3 °F (36.3 °C) -- -- -- 166/96 -- -- DII   02/21/25 1542 97.4 °F (36.3 °C) 97 93 % -- 165/96 -- -- DII   02/21/25 1435 -- 83 -- -- 141/79 -- -- AK   02/21/25 1325 -- -- 94 % -- -- -- -- CW   02/21/25 1100 -- -- 94 % -- -- No Pain -- AS   02/21/25 0735 97.8 °F (36.6 °C) 83 93 % 18 141/79 -- -- DII   02/21/25 0719 -- -- 93 % -- -- -- -- CW   02/21/25 0456 -- 89 95 % -- 135/79 -- -- DII   02/21/25 0330 97.5 °F (36.4 °C) 90 93 % 18 164/86 -- -- DII   02/21/25 0107 -- 89 93 % -- 166/84 -- -- DII   02/20/25 2234 97.9 °F (36.6 °C) 98 93 % 18 169/101 -- -- DII   02/20/25 1930 -- -- -- -- -- No Pain -- BG   02/20/25 1858 97.9 °F (36.6 °C) 101 91 % 18 154/83 -- -- DII   02/20/25 1818 --  -- 93 % -- -- -- -- CW   02/20/25 1457 97.6 °F (36.4 °C) 93 93 % 18 158/87 -- -- DII   02/20/25 1333 -- -- 92 % -- -- -- -- CW   02/20/25 1000 -- -- -- -- -- No Pain -- MS   02/20/25 0745 97.6 °F (36.4 °C) 84 92 % 19 163/88 -- -- DII   02/20/25 0718 -- -- 94 % -- -- -- -- CW   02/20/25 0325 97.8 °F (36.6 °C) 93 93 % 20 168/93 -- -- DII   02/19/25 2352 -- 93 94 % -- 172/94 -- -- DII   02/19/25 2330 -- -- -- -- -- No Pain -- BG   02/19/25 2228 -- -- -- -- -- No Pain -- BG   02/19/25 2228 98.4 °F (36.9 °C) 92 94 % 18 180/98 -- -- DII   02/19/25 2141 -- -- -- -- -- 4 -- BS   02/19/25 2115 -- 91 95 % 20 188/95 -- -- BS   02/19/25 2000 -- 92 93 % 20 185/90 -- -- BS   02/19/25 1900 -- 96 94 % 20 173/67 -- -- BS   02/19/25 1815 -- 92 96 % 20 172/83 -- -- BS   02/19/25 1745 -- 91 94 % 19 153/72 -- -- SG   02/19/25 1601 98.2 °F (36.8 °C) 118 93 % 18 196/91 -- -- KW            Physical Exam  Vitals and nursing note reviewed.   Constitutional:       General: He is not in acute distress.     Appearance: He is well-developed.   HENT:      Head: Normocephalic and atraumatic.   Eyes:      Conjunctiva/sclera: Conjunctivae normal.   Cardiovascular:      Rate and Rhythm: Regular rhythm. Tachycardia present.      Heart sounds: No murmur heard.  Pulmonary:      Effort: Pulmonary effort is normal. No respiratory distress.      Breath sounds: Wheezing present.   Abdominal:      Palpations: Abdomen is soft.      Tenderness: There is no abdominal tenderness.   Musculoskeletal:         General: No swelling.      Cervical back: Neck supple.   Skin:     General: Skin is warm and dry.      Capillary Refill: Capillary refill takes less than 2 seconds.   Neurological:      General: No focal deficit present.      Mental Status: He is alert and oriented to person, place, and time.   Psychiatric:         Mood and Affect: Mood normal.         Results Reviewed       Procedure Component Value Units Date/Time    HS Troponin I 4hr [[id number]]  (Abnormal)  Collected: 02/19/25 2105    Lab Status: Final result Specimen: Blood from Arm, Right Updated: 02/19/25 2154     hs TnI 4hr 64 ng/L      Delta 4hr hsTnI 12 ng/L     HS Troponin I 2hr [[id number]]  (Abnormal) Collected: 02/19/25 1900    Lab Status: Final result Specimen: Blood from Arm, Right Updated: 02/19/25 1935     hs TnI 2hr 59 ng/L      Delta 2hr hsTnI 7 ng/L     HS Troponin 0hr (reflex protocol) [[id number]]  (Abnormal) Collected: 02/19/25 1655    Lab Status: Final result Specimen: Blood from Arm, Right Updated: 02/19/25 1742     hs TnI 0hr 52 ng/L     B-Type Natriuretic Peptide(BNP) [[id number]]  (Abnormal) Collected: 02/19/25 1655    Lab Status: Final result Specimen: Blood from Arm, Right Updated: 02/19/25 1731      pg/mL     Comprehensive metabolic panel [[id number]]  (Abnormal) Collected: 02/19/25 1655    Lab Status: Final result Specimen: Blood from Arm, Right Updated: 02/19/25 1723     Sodium 139 mmol/L      Potassium 4.1 mmol/L      Chloride 105 mmol/L      CO2 27 mmol/L      ANION GAP 7 mmol/L      BUN 23 mg/dL      Creatinine 0.78 mg/dL      Glucose 145 mg/dL      Calcium 9.0 mg/dL      AST 33 U/L      ALT 52 U/L      Alkaline Phosphatase 55 U/L      Total Protein 6.9 g/dL      Albumin 4.2 g/dL      Total Bilirubin 0.37 mg/dL      eGFR 95 ml/min/1.73sq m     Narrative:      [employer] guidelines for Chronic Kidney Disease (CKD):     Stage 1 with normal or high GFR (GFR > 90 mL/min/1.73 square meters)    Stage 2 Mild CKD (GFR = 60-89 mL/min/1.73 square meters)    Stage 3A Moderate CKD (GFR = 45-59 mL/min/1.73 square meters)    Stage 3B Moderate CKD (GFR = 30-44 mL/min/1.73 square meters)    Stage 4 Severe CKD (GFR = 15-29 mL/min/1.73 square meters)    Stage 5 End Stage CKD (GFR <15 mL/min/1.73 square meters)  Note: GFR calculation is accurate only with a steady state creatinine    CBC and differential [[id number]]  (Abnormal) Collected: 02/19/25 3679    Lab Status: Final  result Specimen: Blood from Arm, Right Updated: 02/19/25 1709     WBC 11.68 Thousand/uL      RBC 4.90 Million/uL      Hemoglobin 14.4 g/dL      Hematocrit 44.3 %      MCV 90 fL      MCH 29.4 pg      MCHC 32.5 g/dL      RDW 15.0 %      MPV 9.8 fL      Platelets 165 Thousands/uL      nRBC 0 /100 WBCs      Segmented % 83 %      Immature Grans % 1 %      Lymphocytes % 9 %      Monocytes % 6 %      Eosinophils Relative 1 %      Basophils Relative 0 %      Absolute Neutrophils 9.77 Thousands/µL      Absolute Immature Grans 0.07 Thousand/uL      Absolute Lymphocytes 0.99 Thousands/µL      Absolute Monocytes 0.73 Thousand/µL      Eosinophils Absolute 0.07 Thousand/µL      Basophils Absolute 0.05 Thousands/µL             CTA chest pe study   Final Interpretation by Angel Barraza MD (02/19 1859)      Limited CTA chest demonstrates no intraluminal filling defect to suggest an acute pulmonary embolus. However, evaluation for small distal subsegmental emboli was limited.      Poor inspiratory effort with scattered bilateral atelectatic lung changes. A few tree-in-bud opacities are noted at the bilateral lung bases with mild bronchial wall thickening suspicious for an infectious or inflammatory bronchiolitis.      No pleural effusion or pneumothorax.      Mild emphysematous lung changes.      Mild right hilar lymphadenopathy likely reactive. Consider a repeat CT chest in 2 to 3 months to assess for stability or improvement.      Workstation performed: [id number]         XR chest 1 view portable   Final Interpretation by Warren Herron MD (02/20 0943)      Mild prominence of the heart. Clear lungs            Workstation performed: [id number]             Procedures    ED Medication and Procedure Management   Prior to Admission Medications   Prescriptions Last Dose Informant Patient Reported? Taking?   Symbicort 160-4.5 MCG/ACT inhaler   Yes No   Sig: Inhale 2 puffs 2 (two) times a day   albuterol (2.5 mg/3 mL) 0.083 %  nebulizer solution   Yes No   Sig: Inhale   albuterol (PROAIR HFA) 90 mcg/act inhaler   Yes No   Sig: Inhale      Facility-Administered Medications: None     Discharge Medication List as of 2/24/2025  1:54 PM        START taking these medications    Details   aspirin 81 mg chewable tablet Chew 1 tablet (81 mg total) daily, Starting Tue 2/25/2025, Normal      carvedilol (COREG) 6.25 mg tablet Take 1 tablet (6.25 mg total) by mouth 2 (two) times a day with meals, Starting Mon 2/24/2025, Normal      doxycycline hyclate (VIBRAMYCIN) 100 mg capsule Take 1 capsule (100 mg total) by mouth every 12 (twelve) hours for 2 doses, Starting Mon 2/24/2025, Until Tue 2/25/2025, Normal      furosemide (LASIX) 20 mg tablet Take 1 tablet (20 mg total) by mouth daily, Starting Mon 2/24/2025, Normal      losartan (COZAAR) 50 mg tablet Take 1 tablet (50 mg total) by mouth daily, Starting Mon 2/24/2025, Normal      predniSONE 10 mg tablet Multiple Dosages:Starting Mon 2/24/2025, Until Wed 2/26/2025 at 2359, THEN Starting Thu 2/27/2025, Until Sat 3/1/2025 at 2359, THEN Starting Sun 3/2/2025, Until Tue 3/4/2025 at 2359, THEN Starting Wed 3/5/2025, Until Fri 3/7/2025 at 2359Take 4 table ts (40 mg total) by mouth daily for 3 days, THEN 3 tablets (30 mg total) daily for 3 days, THEN 2 tablets (20 mg total) daily for 3 days, THEN 1 tablet (10 mg total) daily for 3 days., Normal           CONTINUE these medications which have NOT CHANGED    Details   albuterol (2.5 mg/3 mL) 0.083 % nebulizer solution Inhale, Historical Med      albuterol (PROAIR HFA) 90 mcg/act inhaler Inhale, Historical Med      Symbicort 160-4.5 MCG/ACT inhaler Inhale 2 puffs 2 (two) times a day, Starting Fri 5/28/2021, Historical Med           Outpatient Discharge Orders   Basic metabolic panel   Standing Status: Future Standing Exp. Date: 04/24/26     Ambulatory referral to Pulmonology   Standing Status: Future Standing Exp. Date: 02/24/26      Ambulatory Referral to  Pulmonary Rehabilitation   Standing Status: Future Standing Exp. Date: 02/24/26      NM myocardial perfusion spect (rx stress and/or rest)   Standing Status: Future Standing Exp. Date: 02/21/29     ED SEPSIS DOCUMENTATION   Time reflects when diagnosis was documented in both MDM as applicable and the Disposition within this note       Time User Action Codes Description Comment    2/19/2025  8:33 PM Nohemi Arreola Add [J44.1] COPD exacerbation (HCC)     2/19/2025  8:34 PM Nohemi Arreola Add [J06.9] URI (upper respiratory infection)     2/19/2025  8:34 PM Nohemi Arreola Add [R79.89] Elevated troponin     2/21/2025  2:19 PM Nohemi Hicks Add [R06.02] Shortness of breath     2/23/2025  1:50 PM Enrique Cosme Add [I42.9] Cardiomyopathy, unspecified type (HCC)     2/24/2025  6:39 AM Eugenia Sullivan Modify [R06.02] Shortness of breath     2/24/2025  6:39 AM Eugenia Sullivan Modify [I42.9] Cardiomyopathy, unspecified type (HCC)     2/24/2025 11:52 AM CruzDoreen iniguez Modify [R06.02] Shortness of breath     2/24/2025 11:52 AM CruzTara iniguezelyse Modify [I42.9] Cardiomyopathy, unspecified type (HCC)     2/24/2025 11:52 AM Tara Hutchinsonely Add [I42.9] Cardiomyopathy (HCC)                  Nohemi Arreola DO  03/10/25 0111

## 2025-02-20 PROBLEM — R79.89 ELEVATED TROPONIN: Status: ACTIVE | Noted: 2025-02-20

## 2025-02-20 PROBLEM — J44.1 COPD WITH ACUTE EXACERBATION (HCC): Status: ACTIVE | Noted: 2025-02-20

## 2025-02-20 LAB
ANION GAP SERPL CALCULATED.3IONS-SCNC: 7 MMOL/L (ref 4–13)
ATRIAL RATE: 94 BPM
BUN SERPL-MCNC: 23 MG/DL (ref 5–25)
CALCIUM SERPL-MCNC: 9.2 MG/DL (ref 8.4–10.2)
CHLORIDE SERPL-SCNC: 104 MMOL/L (ref 96–108)
CO2 SERPL-SCNC: 28 MMOL/L (ref 21–32)
CREAT SERPL-MCNC: 0.65 MG/DL (ref 0.6–1.3)
ERYTHROCYTE [DISTWIDTH] IN BLOOD BY AUTOMATED COUNT: 14.9 % (ref 11.6–15.1)
GFR SERPL CREATININE-BSD FRML MDRD: 102 ML/MIN/1.73SQ M
GLUCOSE P FAST SERPL-MCNC: 162 MG/DL (ref 65–99)
GLUCOSE SERPL-MCNC: 162 MG/DL (ref 65–140)
HCT VFR BLD AUTO: 44.9 % (ref 36.5–49.3)
HGB BLD-MCNC: 14.5 G/DL (ref 12–17)
MCH RBC QN AUTO: 29.2 PG (ref 26.8–34.3)
MCHC RBC AUTO-ENTMCNC: 32.3 G/DL (ref 31.4–37.4)
MCV RBC AUTO: 90 FL (ref 82–98)
P AXIS: 57 DEGREES
PLATELET # BLD AUTO: 160 THOUSANDS/UL (ref 149–390)
PMV BLD AUTO: 9.7 FL (ref 8.9–12.7)
POTASSIUM SERPL-SCNC: 4.5 MMOL/L (ref 3.5–5.3)
PR INTERVAL: 152 MS
QRS AXIS: 188 DEGREES
QRSD INTERVAL: 164 MS
QT INTERVAL: 382 MS
QTC INTERVAL: 477 MS
RBC # BLD AUTO: 4.97 MILLION/UL (ref 3.88–5.62)
SODIUM SERPL-SCNC: 139 MMOL/L (ref 135–147)
T WAVE AXIS: 12 DEGREES
VENTRICULAR RATE: 94 BPM
WBC # BLD AUTO: 10.33 THOUSAND/UL (ref 4.31–10.16)

## 2025-02-20 PROCEDURE — 94640 AIRWAY INHALATION TREATMENT: CPT

## 2025-02-20 PROCEDURE — 99222 1ST HOSP IP/OBS MODERATE 55: CPT | Performed by: FAMILY MEDICINE

## 2025-02-20 PROCEDURE — 85027 COMPLETE CBC AUTOMATED: CPT | Performed by: FAMILY MEDICINE

## 2025-02-20 PROCEDURE — NC001 PR NO CHARGE: Performed by: INTERNAL MEDICINE

## 2025-02-20 PROCEDURE — 80048 BASIC METABOLIC PNL TOTAL CA: CPT | Performed by: FAMILY MEDICINE

## 2025-02-20 PROCEDURE — 93010 ELECTROCARDIOGRAM REPORT: CPT | Performed by: INTERNAL MEDICINE

## 2025-02-20 PROCEDURE — 94760 N-INVAS EAR/PLS OXIMETRY 1: CPT

## 2025-02-20 PROCEDURE — 99222 1ST HOSP IP/OBS MODERATE 55: CPT | Performed by: INTERNAL MEDICINE

## 2025-02-20 PROCEDURE — 94664 DEMO&/EVAL PT USE INHALER: CPT

## 2025-02-20 RX ORDER — BUDESONIDE 0.5 MG/2ML
0.5 INHALANT ORAL
Status: DISCONTINUED | OUTPATIENT
Start: 2025-02-20 | End: 2025-02-20

## 2025-02-20 RX ORDER — GUAIFENESIN 600 MG/1
600 TABLET, EXTENDED RELEASE ORAL 2 TIMES DAILY
Status: DISCONTINUED | OUTPATIENT
Start: 2025-02-20 | End: 2025-02-24 | Stop reason: HOSPADM

## 2025-02-20 RX ORDER — LEVALBUTEROL INHALATION SOLUTION 1.25 MG/3ML
1.25 SOLUTION RESPIRATORY (INHALATION)
Status: DISCONTINUED | OUTPATIENT
Start: 2025-02-20 | End: 2025-02-24 | Stop reason: HOSPADM

## 2025-02-20 RX ORDER — ASPIRIN 81 MG/1
81 TABLET, CHEWABLE ORAL DAILY
Status: DISCONTINUED | OUTPATIENT
Start: 2025-02-20 | End: 2025-02-24 | Stop reason: HOSPADM

## 2025-02-20 RX ORDER — BUDESONIDE 0.5 MG/2ML
0.5 INHALANT ORAL
Status: DISCONTINUED | OUTPATIENT
Start: 2025-02-20 | End: 2025-02-24 | Stop reason: HOSPADM

## 2025-02-20 RX ORDER — SODIUM CHLORIDE FOR INHALATION 0.9 %
3 VIAL, NEBULIZER (ML) INHALATION
Status: DISCONTINUED | OUTPATIENT
Start: 2025-02-20 | End: 2025-02-20

## 2025-02-20 RX ORDER — ENOXAPARIN SODIUM 100 MG/ML
40 INJECTION SUBCUTANEOUS EVERY 12 HOURS
Status: DISCONTINUED | OUTPATIENT
Start: 2025-02-20 | End: 2025-02-24 | Stop reason: HOSPADM

## 2025-02-20 RX ORDER — DOXYCYCLINE 100 MG/1
100 CAPSULE ORAL EVERY 12 HOURS
Status: DISCONTINUED | OUTPATIENT
Start: 2025-02-20 | End: 2025-02-24 | Stop reason: HOSPADM

## 2025-02-20 RX ORDER — METHYLPREDNISOLONE SODIUM SUCCINATE 40 MG/ML
40 INJECTION, POWDER, LYOPHILIZED, FOR SOLUTION INTRAMUSCULAR; INTRAVENOUS EVERY 8 HOURS
Status: DISCONTINUED | OUTPATIENT
Start: 2025-02-20 | End: 2025-02-22

## 2025-02-20 RX ADMIN — GUAIFENESIN 600 MG: 600 TABLET, EXTENDED RELEASE ORAL at 08:14

## 2025-02-20 RX ADMIN — DOXYCYCLINE HYCLATE 100 MG: 100 CAPSULE ORAL at 12:23

## 2025-02-20 RX ADMIN — ASPIRIN 81 MG 81 MG: 81 TABLET ORAL at 08:14

## 2025-02-20 RX ADMIN — BUDESONIDE 0.5 MG: 0.5 INHALANT ORAL at 07:17

## 2025-02-20 RX ADMIN — METHYLPREDNISOLONE SODIUM SUCCINATE 40 MG: 40 INJECTION, POWDER, FOR SOLUTION INTRAMUSCULAR; INTRAVENOUS at 05:06

## 2025-02-20 RX ADMIN — LEVALBUTEROL HYDROCHLORIDE 1.25 MG: 1.25 SOLUTION RESPIRATORY (INHALATION) at 13:33

## 2025-02-20 RX ADMIN — ENOXAPARIN SODIUM 40 MG: 40 INJECTION SUBCUTANEOUS at 20:04

## 2025-02-20 RX ADMIN — DOXYCYCLINE HYCLATE 100 MG: 100 CAPSULE ORAL at 01:14

## 2025-02-20 RX ADMIN — LEVALBUTEROL HYDROCHLORIDE 1.25 MG: 1.25 SOLUTION RESPIRATORY (INHALATION) at 07:17

## 2025-02-20 RX ADMIN — METHYLPREDNISOLONE SODIUM SUCCINATE 40 MG: 40 INJECTION, POWDER, FOR SOLUTION INTRAMUSCULAR; INTRAVENOUS at 20:04

## 2025-02-20 RX ADMIN — ENOXAPARIN SODIUM 40 MG: 40 INJECTION SUBCUTANEOUS at 06:21

## 2025-02-20 RX ADMIN — GUAIFENESIN 600 MG: 600 TABLET, EXTENDED RELEASE ORAL at 17:19

## 2025-02-20 RX ADMIN — BUDESONIDE 0.5 MG: 0.5 INHALANT ORAL at 18:18

## 2025-02-20 RX ADMIN — METHYLPREDNISOLONE SODIUM SUCCINATE 40 MG: 40 INJECTION, POWDER, FOR SOLUTION INTRAMUSCULAR; INTRAVENOUS at 12:23

## 2025-02-20 RX ADMIN — LEVALBUTEROL HYDROCHLORIDE 1.25 MG: 1.25 SOLUTION RESPIRATORY (INHALATION) at 18:18

## 2025-02-20 NOTE — NURSING NOTE
Notified Dr. Holder that the heart monitor is showing bundle branch block-message seen, no new order.

## 2025-02-20 NOTE — CONSULTS
Consultation - Cardiology   Armando Archer 65 y.o. male MRN: [id number]  Unit/Bed#: -01 Encounter: [id number]  02/20/25  11:38 AM    Assessment/ Plan: Armando Archer known case of COPD, admitted with the complaints of shortness of breath found to be in the COPD exacerbation, has minimally elevated troponins.  The EKG showed evidence of normal sinus rhythm with a nonspecific changes.  No evidence of congestive heart failure on the physical examination apart from the mild bilateral lower extremity edema plan to proceed ahead with echocardiogram for the assessment for left ventricular systolic function.  No indications at the present time for any invasive cardiac procedures       Assessment & Plan  Elevated troponin  The elevated troponin is most likely because of COPD exacerbation.  EKG showed evidence of nonspecific ST-T wave changes.  Plan to proceed with the echocardiogram for the assessment for left ventricular systolic function and if any abnormality in the LV function is noted, we will proceed with a pharmacological stress testing  COPD with acute exacerbation (HCC)  As per internal medicine team      History of Present Illness   Physician Requesting Consult: Francesco Holder MD    Reason for Consult / Principal Problem: Elevated troponin    HPI: Armando Archer is a 65 y.o. year old male with PMHx of COPD who presents with shortness of breath.  He was found to have the elevated troponins.  From the cardiac standpoint of view no evidence of having any chest pain on exertion in the recent time.  No documented history of coronary artery disease or congestive heart failure.  He quit smoking approximately 15 years ago.      Consults    EKG: Normal sinus rhythm with a nonspecific changes  Tele: No evidence of significant arrhythmias    Review of Systems   Constitutional: Negative.  Negative for chills and fever.   HENT:  Negative for ear pain and sore throat.    Eyes:  Negative for pain and visual disturbance.   Respiratory:   "Positive for cough and shortness of breath.    Cardiovascular:  Negative for chest pain and palpitations.   Gastrointestinal:  Negative for abdominal pain and vomiting.   Genitourinary:  Negative for dysuria and hematuria.   Musculoskeletal:  Negative for arthralgias and back pain.   Skin:  Negative for color change and rash.   Neurological:  Negative for seizures and syncope.   All other systems reviewed and are negative.      Historical Information   Past Medical History:   Diagnosis Date    COPD (chronic obstructive pulmonary disease) (HCC)      History reviewed. No pertinent surgical history.  Social History     Substance and Sexual Activity   Alcohol Use Not Currently     Social History     Substance and Sexual Activity   Drug Use Not on file     Social History     Tobacco Use   Smoking Status Former   Smokeless Tobacco Never       Family History: History reviewed. No pertinent family history.    Meds/Allergies   all current active meds have been reviewed  Allergies   Allergen Reactions    Other Other (See Comments)     Bee stings         Objective   Vitals: Blood pressure 163/88, pulse 84, temperature 97.6 °F (36.4 °C), resp. rate 19, height 6' 1\" (1.854 m), weight (!) 152 kg (335 lb 12.2 oz), SpO2 92%., Body mass index is 44.3 kg/m².,   Orthostatic Blood Pressures      Flowsheet Row Most Recent Value   Blood Pressure 163/88 filed at 2025 0745   Patient Position - Orthostatic VS Sitting filed at 2025 2228            Systolic (24hrs), Av , Min:153 , Max:196     Diastolic (24hrs), Av, Min:67, Max:98        Intake/Output Summary (Last 24 hours) at 2025 1138  Last data filed at 2025 0745  Gross per 24 hour   Intake 320 ml   Output --   Net 320 ml       Invasive Devices       Peripheral Intravenous Line  Duration             Peripheral IV 25 Right Antecubital <1 day                        Physical Exam  Vitals and nursing note reviewed.   Constitutional:       Appearance: Normal " appearance.   HENT:      Head: Normocephalic.   Eyes:      Pupils: Pupils are equal, round, and reactive to light.   Cardiovascular:      Pulses: Normal pulses.      Heart sounds: Normal heart sounds. No murmur heard.     No gallop.   Pulmonary:      Effort: Pulmonary effort is normal. No respiratory distress.      Breath sounds: Normal breath sounds. No decreased breath sounds, wheezing or rales.      Comments: No evidence of having any crackles suggestive of congestive heart failure  Abdominal:      General: Abdomen is flat.      Palpations: Abdomen is soft.   Musculoskeletal:         General: Normal range of motion.      Cervical back: Normal range of motion and neck supple.      Right lower leg: Edema present.      Left lower leg: Edema present.   Skin:     General: Skin is warm and dry.   Neurological:      Mental Status: He is alert.   Psychiatric:         Mood and Affect: Mood normal.               Lab Results:     Troponins:   Results from last 7 days   Lab Units 02/19/25  2105 02/19/25  1900 02/19/25  1655   HS TNI 0HR ng/L  --   --  52*   HS TNI 2HR ng/L  --  59*  --    HS TNI 4HR ng/L 64*  --   --    HSTNI D4 ng/L 12  --   --        CBC with diff:   Results from last 7 days   Lab Units 02/20/25  0454 02/19/25  1655   WBC Thousand/uL 10.33* 11.68*   HEMOGLOBIN g/dL 14.5 14.4   HEMATOCRIT % 44.9 44.3   MCV fL 90 90   PLATELETS Thousands/uL 160 165   RBC Million/uL 4.97 4.90   MCH pg 29.2 29.4   MCHC g/dL 32.3 32.5   RDW % 14.9 15.0   MPV fL 9.7 9.8   NRBC AUTO /100 WBCs  --  0         CMP:   Results from last 7 days   Lab Units 02/20/25  0454 02/19/25  1655 02/17/25  0858   POTASSIUM mmol/L 4.5 4.1 4.5   CHLORIDE mmol/L 104 105 100   CO2 mmol/L 28 27 33*   BUN mg/dL 23 23 21   CREATININE mg/dL 0.65 0.78 0.79   CALCIUM mg/dL 9.2 9.0 9.6   AST U/L  --  33 31   ALT U/L  --  52 40   ALK PHOS U/L  --  55 54   EGFR ml/min/1.73sq m 102 95 99     Time spent 45 minutes in reviewing outpatient notes, earlier  inpatient hospitalization notes, reviewing and interpreting labs, reviewing chest x-ray report, reviewing interpreting EKG, discussion and educating patient, documentation.     ** Please Note: Fluency DirectDictation voice to text software may have been used in the creation of this document. **

## 2025-02-20 NOTE — UTILIZATION REVIEW
Initial Clinical Review      OBS order 2/19 2043 converted to IP on 2/21 1015 for COPD exacerbation requiring O2 and IV solumedrol .     Admission: Date/Time/Statement:   Admission Orders (From admission, onward)       Ordered        02/21/25 1015  INPATIENT ADMISSION  Once            02/19/25 2043  Place in Observation  Once                           INPATIENT ADMISSION     Standing Status:   Standing     Number of Occurrences:   1     Level of Care:   Med Surg [16]     Estimated length of stay:   More than 2 Midnights     Certification:   I certify that inpatient services are medically necessary for this patient for a duration of greater than two midnights. See H&P and MD Progress Notes for additional information about the patient's course of treatment.     ED Arrival Information       Expected   -    Arrival   2/19/2025 15:55    Acuity   Emergent              Means of arrival   Walk-In    Escorted by   Spouse    Service   Hospitalist    Admission type   Emergency              Arrival complaint   Shortness Of Breath             Chief Complaint   Patient presents with    Shortness of Breath     Hx copd, uses nebs at home, dyspnea with exertion.          Initial Presentation: 65 y.o. male to ED from home w/ PMH of COPD who presents with shortness of breath.  The patient is been using nebulizers, recently saw outpatient provider and was given Levaquin and steroids.  The patient has dyspnea on exertion.  States his chest just feels tight. Trop mildly elevated . Admitted OBS status w / COPD exacerbation plan for doxycyline , nebs , mucinex . Consult cardiology for elevated trop .     PE: dec breath sounds     Anticipated Length of Stay/Certification Statement: Patient will be admitted on an observation basis with an anticipated length of stay of less than 2 midnights secondary to COPD exacerbation.     2/20 Cardiology Consult    elevated troponin is most likely because of COPD exacerbation.  EKG showed evidence of  nonspecific ST-T wave changes.  Plan to proceed with the echocardiogram for the assessment for left ventricular systolic function and if any abnormality in the LV function is noted, we will proceed with a pharmacological stress testing .     2/21 IM Note   Reports breathing improved . Cont iv steroids q8hr , wean to 40 q12hr . If continues to improve possible transition to p.o. steroids tomorrow . Echo pending   BS diminished , NP cough .93-95 % on 2l .     ED Treatment-Medication Administration from 02/19/2025 1555 to 02/19/2025 2221         Date/Time Order Dose Route Action     02/19/2025 1655 ipratropium-albuterol (DUO-NEB) 0.5-2.5 mg/3 mL inhalation solution 3 mL 3 mL Nebulization Given     02/19/2025 1655 sodium chloride 3 % inhalation solution 4 mL 4 mL Nebulization Given     02/19/2025 1813 iohexol (OMNIPAQUE) 350 MG/ML injection (MULTI-DOSE) 100 mL 100 mL Intravenous Given     02/19/2025 2105 sodium chloride 3 % inhalation solution 4 mL 4 mL Nebulization Given     02/19/2025 2034 aspirin tablet 325 mg 325 mg Oral Given     02/19/2025 2035 methylPREDNISolone sodium succinate (Solu-MEDROL) injection 80 mg 80 mg Intravenous Given     02/19/2025 2105 ipratropium-albuterol (DUO-NEB) 0.5-2.5 mg/3 mL inhalation solution 3 mL 3 mL Nebulization Given            Scheduled Medications:  aspirin, 81 mg, Oral, Daily  budesonide, 0.5 mg, Nebulization, Q12H  doxycycline hyclate, 100 mg, Oral, Q12H  enoxaparin, 40 mg, Subcutaneous, Q12H  guaiFENesin, 600 mg, Oral, BID  levalbuterol, 1.25 mg, Nebulization, TID  methylPREDNISolone sodium succinate, 40 mg, Intravenous, Q8H      Continuous IV Infusions:     PRN Meds:  acetaminophen, 650 mg, Oral, Q6H PRN      ED Triage Vitals   Temperature Pulse Respirations Blood Pressure SpO2 Pain Score   02/19/25 1601 02/19/25 1601 02/19/25 1601 02/19/25 1601 02/19/25 1601 02/19/25 2141   98.2 °F (36.8 °C) (!) 118 18 (!) 196/91 93 % 4     Weight (last 2 days)       Date/Time Weight     02/19/25 22:28:05 152 (335.76)    02/19/25 1601 141 (310)            Vital Signs (last 3 days)       Date/Time Temp Pulse Resp BP MAP (mmHg) SpO2 Calculated FIO2 (%) - Nasal Cannula Nasal Cannula O2 Flow Rate (L/min) O2 Device Patient Position - Orthostatic VS Pain    02/21/25 07:35:42 97.8 °F (36.6 °C) 83 18 141/79 100 93 % -- -- -- -- --    02/21/25 0719 -- -- -- -- -- 93 % 28 2 L/min Nasal cannula -- --    02/21/25 04:56:31 -- 89 -- 135/79 98 95 % -- -- -- -- --    02/21/25 03:30:25 97.5 °F (36.4 °C) 90 18 164/86 112 93 % -- -- -- -- --    02/21/25 01:07:47 -- 89 -- 166/84 111 93 % -- -- -- -- --    02/20/25 22:34:02 97.9 °F (36.6 °C) 98 18 169/101 124 93 % -- -- -- -- --    02/20/25 1930 -- -- -- -- -- -- 28 2 L/min Nasal cannula -- No Pain    02/20/25 18:58:47 97.9 °F (36.6 °C) 101 18 154/83 107 91 % -- -- -- -- --    02/20/25 1818 -- -- -- -- -- 93 % 28 2 L/min Nasal cannula -- --    02/20/25 14:57:11 97.6 °F (36.4 °C) 93 18 158/87 111 93 % -- -- -- -- --    02/20/25 1333 -- -- -- -- -- 92 % -- -- None (Room air) -- --    02/20/25 1000 -- -- -- -- -- -- -- -- -- -- No Pain    02/20/25 07:45:41 97.6 °F (36.4 °C) 84 19 163/88 113 92 % 28 2 L/min Nasal cannula -- --    02/20/25 0718 -- -- -- -- -- 94 % 28 2 L/min Nasal cannula -- --    02/20/25 03:25:34 97.8 °F (36.6 °C) 93 20 168/93 118 93 % -- -- -- -- --    02/19/25 23:52:12 -- 93 -- 172/94 120 94 % -- -- -- -- --    02/19/25 2330 -- -- -- -- -- -- 28 2 L/min Nasal cannula -- No Pain    02/19/25 22:28:05 98.4 °F (36.9 °C) 92 18 180/98 125 94 % -- -- None (Room air) Sitting No Pain    02/19/25 2141 -- -- -- -- -- -- -- -- -- -- 4    02/19/25 2115 -- 91 20 188/95 129 95 % -- -- -- -- --    02/19/25 2000 -- 92 20 185/90 127 93 % -- -- -- -- --    02/19/25 1900 -- 96 20 173/67 97 94 % -- -- -- -- --    02/19/25 1815 -- 92 20 172/83 119 96 % -- -- -- -- --    02/19/25 1745 -- 91 19 153/72 104 94 % 28 2 L/min Nasal cannula Sitting --    02/19/25 1601 98.2 °F (36.8  °C) 118 18 196/91 131 93 % -- -- None (Room air) Sitting --              Pertinent Labs/Diagnostic Test Results:   Radiology:  CTA chest pe study   Final Interpretation by Angel Barraza MD (02/19 1859)      Limited CTA chest demonstrates no intraluminal filling defect to suggest an acute pulmonary embolus. However, evaluation for small distal subsegmental emboli was limited.      Poor inspiratory effort with scattered bilateral atelectatic lung changes. A few tree-in-bud opacities are noted at the bilateral lung bases with mild bronchial wall thickening suspicious for an infectious or inflammatory bronchiolitis.      No pleural effusion or pneumothorax.      Mild emphysematous lung changes.      Mild right hilar lymphadenopathy likely reactive. Consider a repeat CT chest in 2 to 3 months to assess for stability or improvement.      Workstation performed: [id number]         XR chest 1 view portable   Final Interpretation by Warren Herron MD (02/20 0943)      Mild prominence of the heart. Clear lungs            Workstation performed: [id number]           Cardiology:  ECG 12 lead   Final Result by Charito Mathur MD (02/20 1731)   Normal sinus rhythm   Right bundle branch block   Septal infarct , age undetermined   Abnormal ECG   No previous ECGs available   Confirmed by Charito Mathur ([id number]) on 2/20/2025 5:31:36 PM        GI:  No orders to display           Results from last 7 days   Lab Units 02/20/25  0454 02/19/25  1655   WBC Thousand/uL 10.33* 11.68*   HEMOGLOBIN g/dL 14.5 14.4   HEMATOCRIT % 44.9 44.3   PLATELETS Thousands/uL 160 165   TOTAL NEUT ABS Thousands/µL  --  9.77*         Results from last 7 days   Lab Units 02/20/25  0454 02/19/25  1655 02/17/25  0858   SODIUM mmol/L 139 139 140   POTASSIUM mmol/L 4.5 4.1 4.5   CHLORIDE mmol/L 104 105 100   CO2 mmol/L 28 27 33*   ANION GAP mmol/L 7 7 7   BUN mg/dL 23 23 21   CREATININE mg/dL 0.65 0.78 0.79   EGFR ml/min/1.73sq m 102 95 99   CALCIUM  mg/dL 9.2 9.0 9.6     Results from last 7 days   Lab Units 02/19/25  1655 02/17/25  0858   AST U/L 33 31   ALT U/L 52 40   ALK PHOS U/L 55 54   TOTAL PROTEIN g/dL 6.9 7   ALBUMIN g/dL 4.2 4.5   TOTAL BILIRUBIN mg/dL 0.37 0.9       Results from last 7 days   Lab Units 02/20/25  0454 02/19/25  1655 02/17/25  0858   GLUCOSE RANDOM mg/dL 162* 145* 97     Results from last 7 days   Lab Units 02/19/25  2105 02/19/25  1900 02/19/25  1655   HS TNI 0HR ng/L  --   --  52*   HS TNI 2HR ng/L  --  59*  --    HSTNI D2 ng/L  --  7  --    HS TNI 4HR ng/L 64*  --   --    HSTNI D4 ng/L 12  --   --      Results from last 7 days   Lab Units 02/19/25  1655   BNP pg/mL 112*     Past Medical History:   Diagnosis Date    COPD (chronic obstructive pulmonary disease) (HCC)      Present on Admission:   COPD with acute exacerbation (HCC)      Admitting Diagnosis: URI (upper respiratory infection) [J06.9]  Elevated troponin [R79.89]  COPD exacerbation (HCC) [J44.1]  Age/Sex: 65 y.o. male    Network Utilization Review Department  ATTENTION: Please call with any questions or concerns to [phone] and carefully listen to the prompts so that you are directed to the right person. All voicemails are confidential.   For Discharge needs, contact Care Management DC Support Team at [phone] opt. [phone]  Send all requests for admission clinical reviews, approved or denied determinations and any other requests to dedicated fax number below belonging to the campus where the patient is receiving treatment. List of dedicated fax numbers for the Facilities:  FACILITY NAME UR FAX NUMBER   ADMISSION DENIALS (Administrative/Medical Necessity) [phone]   DISCHARGE SUPPORT TEAM (NETWORK) [phone]   PARENT CHILD HEALTH (Maternity/NICU/Pediatrics) [phone]   [facility] [phone]   [facility] [phone]   [facility] [phone]   [facility]  [phone]   [facility] [phone]   [facility] [phone]   [facility] [phone]   [facility] [phone]   [facility] [phone]   [facility] [phone]   [facility] [phone]   [facility] [phone]

## 2025-02-20 NOTE — CONSULTS
Consultation - Cardiology   Name: Armando Archer 65 y.o. male I MRN: [id number]  Unit/Bed#: -01 I Date of Admission: 2/19/2025   Date of Service: 2/20/2025 I Hospital Day: 0   Inpatient consult to Cardiology  Consult performed by: Nohemi Hicks PA-C  Consult ordered by: Rolf Perez MD        See consult done earlier by dr leigh

## 2025-02-20 NOTE — PLAN OF CARE
Problem: Potential for Falls  Goal: Patient will remain free of falls  Description: INTERVENTIONS:  - Educate patient/family on patient safety including physical limitations  - Instruct patient to call for assistance with activity   - Consult OT/PT to assist with strengthening/mobility   - Keep Call bell within reach  - Keep bed low and locked with side rails adjusted as appropriate  - Keep care items and personal belongings within reach  - Initiate and maintain comfort rounds  - Make Fall Risk Sign visible to staff  - Offer Toileting every 2 Hours, in advance of need  - Initiate/Maintain alarm  - Obtain necessary fall risk management equipment  - Apply yellow socks and bracelet for high fall risk patients  - Consider moving patient to room near nurses station  Outcome: Progressing     Problem: PAIN - ADULT  Goal: Verbalizes/displays adequate comfort level or baseline comfort level  Description: Interventions:  - Encourage patient to monitor pain and request assistance  - Assess pain using appropriate pain scale  - Administer analgesics based on type and severity of pain and evaluate response  - Implement non-pharmacological measures as appropriate and evaluate response  - Consider cultural and social influences on pain and pain management  - Notify physician/advanced practitioner if interventions unsuccessful or patient reports new pain  Outcome: Progressing     Problem: INFECTION - ADULT  Goal: Absence or prevention of progression during hospitalization  Description: INTERVENTIONS:  - Assess and monitor for signs and symptoms of infection  - Monitor lab/diagnostic results  - Monitor all insertion sites, i.e. indwelling lines, tubes, and drains  - Monitor endotracheal if appropriate and nasal secretions for changes in amount and color  - [place] appropriate cooling/warming therapies per order  - Administer medications as ordered  - Instruct and encourage patient and family to use good hand hygiene technique  -  Identify and instruct in appropriate isolation precautions for identified infection/condition  Outcome: Progressing  Goal: Absence of fever/infection during neutropenic period  Description: INTERVENTIONS:  - Monitor WBC    Outcome: Progressing     Problem: SAFETY ADULT  Goal: Patient will remain free of falls  Description: INTERVENTIONS:  - Educate patient/family on patient safety including physical limitations  - Instruct patient to call for assistance with activity   - Consult OT/PT to assist with strengthening/mobility   - Keep Call bell within reach  - Keep bed low and locked with side rails adjusted as appropriate  - Keep care items and personal belongings within reach  - Initiate and maintain comfort rounds  - Make Fall Risk Sign visible to staff  - Offer Toileting every 2 Hours, in advance of need  - Initiate/Maintain alarm  - Obtain necessary fall risk management equipment  - Apply yellow socks and bracelet for high fall risk patients  - Consider moving patient to room near nurses station  Outcome: Progressing  Goal: Maintain or return to baseline ADL function  Description: INTERVENTIONS:  -  Assess patient's ability to carry out ADLs; assess patient's baseline for ADL function and identify physical deficits which impact ability to perform ADLs (bathing, care of mouth/teeth, toileting, grooming, dressing, etc.)  - Assess/evaluate cause of self-care deficits   - Assess range of motion  - Assess patient's mobility; develop plan if impaired  - Assess patient's need for assistive devices and provide as appropriate  - Encourage maximum independence but intervene and supervise when necessary  - Involve family in performance of ADLs  - Assess for home care needs following discharge   - Consider OT consult to assist with ADL evaluation and planning for discharge  - Provide patient education as appropriate  Outcome: Progressing  Goal: Maintains/Returns to pre admission functional level  Description: INTERVENTIONS:  -  Perform AM-PAC 6 Click Basic Mobility/ Daily Activity assessment daily.  - Set and communicate daily mobility goal to care team and patient/family/caregiver.   - Collaborate with rehabilitation services on mobility goals if consulted  - Perform Range of Motion 3 times a day.  - Reposition patient every 3 hours.  - Dangle patient 3 times a day  - Stand patient 3 times a day  - Ambulate patient 3 times a day  - Out of bed to chair 3 times a day   - Out of bed for meals 3 times a day  - Out of bed for toileting  - Record patient progress and toleration of activity level   Outcome: Progressing     Problem: DISCHARGE PLANNING  Goal: Discharge to home or other facility with appropriate resources  Description: INTERVENTIONS:  - Identify barriers to discharge w/patient and caregiver  - Arrange for needed discharge resources and transportation as appropriate  - Identify discharge learning needs (meds, wound care, etc.)  - Arrange for interpretive services to assist at discharge as needed  - Refer to Case Management Department for coordinating discharge planning if the patient needs post-hospital services based on physician/advanced practitioner order or complex needs related to functional status, cognitive ability, or social support system  Outcome: Progressing     Problem: Knowledge Deficit  Goal: Patient/family/caregiver demonstrates understanding of disease process, treatment plan, medications, and discharge instructions  Description: Complete learning assessment and assess knowledge base.  Interventions:  - Provide teaching at level of understanding  - Provide teaching via preferred learning methods  Outcome: Progressing     Problem: RESPIRATORY - ADULT  Goal: Achieves optimal ventilation and oxygenation  Description: INTERVENTIONS:  - Assess for changes in respiratory status  - Assess for changes in mentation and behavior  - Position to facilitate oxygenation and minimize respiratory effort  - Oxygen administered by  appropriate delivery if ordered  - Initiate smoking cessation education as indicated  - Encourage broncho-pulmonary hygiene including cough, deep breathe, Incentive Spirometry  - Assess the need for suctioning and aspirate as needed  - Assess and instruct to report SOB or any respiratory difficulty  - Respiratory Therapy support as indicated  Outcome: Progressing

## 2025-02-20 NOTE — H&P
H&P - Hospitalist   Name: Armando Archer 65 y.o. male I MRN: [id number]  Unit/Bed#: -01 I Date of Admission: 2/19/2025   Date of Service: 2/20/2025 I Hospital Day: 0     Assessment & Plan  COPD with acute exacerbation (HCC)  Recently on steroids and Levaquin  Patient followed by [facility] pulmonology  Will do doxycycline 100 mg twice daily  IV steroids  Breathing treatments  Mucinex  Elevated troponin  Mildly elevated troponin, no chest pain.  Will consult cardiology      VTE Pharmacologic Prophylaxis: VTE Score: 3 Moderate Risk (Score 3-4) - Pharmacological DVT Prophylaxis Ordered: enoxaparin (Lovenox).  Code Status: Level 1 - Full Code       Anticipated Length of Stay: Patient will be admitted on an observation basis with an anticipated length of stay of less than 2 midnights secondary to COPD exacerbation.    History of Present Illness   Chief Complaint: Shortness of breath    Armando Archer is a 65 y.o. male with a PMH of COPD who presents with shortness of breath.  The patient is been using nebulizers, recently saw outpatient provider and was given Levaquin and steroids.  The patient has dyspnea on exertion.  States his chest just feels tight.  No fevers or chills at home    Review of Systems   Constitutional: Negative.    HENT: Negative.     Respiratory:  Positive for cough and shortness of breath.    Cardiovascular: Negative.    Gastrointestinal: Negative.    Genitourinary: Negative.    Neurological:  Positive for weakness.       Historical Information   Past Medical History:   Diagnosis Date    COPD (chronic obstructive pulmonary disease) (HCC)      History reviewed. No pertinent surgical history.  Social History     Tobacco Use    Smoking status: Former    Smokeless tobacco: Never   Substance and Sexual Activity    Alcohol use: Not Currently    Drug use: Not on file    Sexual activity: Not on file     E-Cigarette/Vaping     E-Cigarette/Vaping Substances     Family history non-contributory  Social History:  Marital  Status: /Civil Union   Occupation:   Patient Pre-hospital Living Situation: Home  Patient Pre-hospital Level of Mobility: walks  Patient Pre-hospital Diet Restrictions: None    Meds/Allergies   I have reviewed home medications with patient personally.  Prior to Admission medications    Medication Sig Start Date End Date Taking? Authorizing Provider   albuterol (2.5 mg/3 mL) 0.083 % nebulizer solution Inhale    Historical Provider, MD   albuterol (PROAIR HFA) 90 mcg/act inhaler Inhale    Historical Provider, MD   Symbicort 160-4.5 MCG/ACT inhaler Inhale 2 puffs 2 (two) times a day 5/28/21   Historical Provider, MD     Allergies   Allergen Reactions    Other Other (See Comments)     Bee stings         Objective :  Temp:  [98.2 °F (36.8 °C)-98.4 °F (36.9 °C)] 98.4 °F (36.9 °C)  HR:  [] 93  BP: (153-196)/(67-98) 172/94  Resp:  [18-20] 18  SpO2:  [93 %-96 %] 94 %  O2 Device: None (Room air)  Nasal Cannula O2 Flow Rate (L/min):  [2 L/min] 2 L/min    Physical Exam  Constitutional:       Appearance: He is normal weight.   HENT:      Head: Normocephalic and atraumatic.      Nose: Nose normal.      Mouth/Throat:      Mouth: Mucous membranes are moist.      Pharynx: Oropharynx is clear.   Eyes:      Extraocular Movements: Extraocular movements intact.      Conjunctiva/sclera: Conjunctivae normal.   Cardiovascular:      Rate and Rhythm: Normal rate and regular rhythm.      Pulses: Normal pulses.      Heart sounds: Normal heart sounds.   Pulmonary:      Comments: Decreased breath sounds  Abdominal:      General: There is no distension.      Palpations: Abdomen is soft.      Tenderness: There is no abdominal tenderness. There is no guarding.   Musculoskeletal:         General: Normal range of motion.   Skin:     General: Skin is warm and dry.   Neurological:      General: No focal deficit present.      Mental Status: He is alert and oriented to person, place, and time. Mental status is at baseline.   Psychiatric:          Mood and Affect: Mood normal.         Behavior: Behavior normal.         Thought Content: Thought content normal.                Lab Results: I have reviewed the following results:  Results from last 7 days   Lab Units 02/19/25  1655   WBC Thousand/uL 11.68*   HEMOGLOBIN g/dL 14.4   HEMATOCRIT % 44.3   PLATELETS Thousands/uL 165   SEGS PCT % 83*   LYMPHO PCT % 9*   MONO PCT % 6   EOS PCT % 1     Results from last 7 days   Lab Units 02/19/25  1655   SODIUM mmol/L 139   POTASSIUM mmol/L 4.1   CHLORIDE mmol/L 105   CO2 mmol/L 27   BUN mg/dL 23   CREATININE mg/dL 0.78   ANION GAP mmol/L 7   CALCIUM mg/dL 9.0   ALBUMIN g/dL 4.2   TOTAL BILIRUBIN mg/dL 0.37   ALK PHOS U/L 55   ALT U/L 52   AST U/L 33   GLUCOSE RANDOM mg/dL 145*         Imaging Results Review: I reviewed radiology reports from this admission including: CT chest.  Other Study Results Review: No additional pertinent studies reviewed.    Administrative Statements     Medical decision making: Moderate  Diagnosis addressed: Exacerbation of COPD  Data:   Reviewed  CBC, CMP, troponin, BNP, CT of the chest  Ordered CBC, BMP,  Reviewed external notes from pulmonary  Discussion of management with ER provider: Aspirin, steroids given           Risk:  Prescription drug management: IV doxycycline, aspirin, steroids  Discussion for hospitalization with ER provider: Observation for COPD exacerbation elevated troponin        ** Please Note: This note has been constructed using a voice recognition system. **

## 2025-02-20 NOTE — MALNUTRITION/BMI
This medical record reflects one or more clinical indicators suggestive of Class 3 Obesity      BMI Findings:   Class 3 Obesity        Body mass index is 44.3 kg/m².     See Nutrition note dated 2/20/25 for additional details.  Completed nutrition assessment is viewable in the nutrition documentation.

## 2025-02-20 NOTE — ASSESSMENT & PLAN NOTE
The elevated troponin is most likely because of COPD exacerbation.  EKG showed evidence of nonspecific ST-T wave changes.  Plan to proceed with the echocardiogram for the assessment for left ventricular systolic function and if any abnormality in the LV function is noted, we will proceed with a pharmacological stress testing

## 2025-02-20 NOTE — ASSESSMENT & PLAN NOTE
Recently on steroids and Levaquin  Patient followed by [facility] pulmonology  Will do doxycycline 100 mg twice daily  IV steroids  Breathing treatments  Mucinex

## 2025-02-20 NOTE — RESPIRATORY THERAPY NOTE
RT Protocol Note  Armando Archer 65 y.o. male MRN: [id number]  Unit/Bed#: -01 Encounter: [id number]    Assessment    Principal Problem:    COPD with acute exacerbation (HCC)  Active Problems:    Elevated troponin      Home Pulmonary Medications:  Inhalers/neb       Past Medical History:   Diagnosis Date    COPD (chronic obstructive pulmonary disease) (HCC)      Social History     Socioeconomic History    Marital status: /Civil Union     Spouse name: None    Number of children: None    Years of education: None    Highest education level: None   Occupational History    None   Tobacco Use    Smoking status: Former    Smokeless tobacco: Never   Substance and Sexual Activity    Alcohol use: Not Currently    Drug use: None    Sexual activity: None   Other Topics Concern    None   Social History Narrative    None     Social Drivers of Health     Financial Resource Strain: Not on file   Food Insecurity: No Food Insecurity (2/20/2025)    Nursing - Inadequate Food Risk Classification     Worried About Running Out of Food in the Last Year: Not on file     Ran Out of Food in the Last Year: Not on file     Ran Out of Food in the Last Year: Never true   Transportation Needs: No Transportation Needs (2/20/2025)    Nursing - Transportation Risk Classification     Lack of Transportation: Not on file     Lack of Transportation: No   Physical Activity: Not on file   Stress: Not on file   Social Connections: Unknown (6/18/2024)    Received from [facility]    Social [employer]     How often do you feel lonely or isolated from those around you? (Adult - for ages 18 years and over): Not on file   Intimate Partner Violence: Unknown (2/20/2025)    Nursing IPS     Feels Physically and Emotionally Safe: Not on file     Physically Hurt by Someone: Not on file     Humiliated or Emotionally Abused by Someone: Not on file     Physically Hurt by Someone: No     Hurt or Threatened by Someone: No   Housing Stability: Unknown (2/20/2025)     "Nursing: Inadequate Housing Risk Classification     Has Housing: Not on file     Worried About Losing Housing: Not on file     Unable to Get Utilities: Not on file     Unable to Pay for Housing in the Last Year: No     Has Housin       Subjective         Objective    Physical Exam:   Assessment Type: Post-treatment  General Appearance: Awake, Alert  Respiratory Pattern: Normal  Chest Assessment: Chest expansion symmetrical  Bilateral Breath Sounds: (P) Diminished  Cough: (P) None  O2 Device: (P) .nc    Vitals:  Blood pressure 168/93, pulse 93, temperature 97.8 °F (36.6 °C), resp. rate 20, height 6' 1\" (1.854 m), weight (!) 152 kg (335 lb 12.2 oz), SpO2 94%.          Imaging and other studies: Results Review Statement: No pertinent imaging studies reviewed.    O2 Device: (P) .nc     Plan    Respiratory Plan: Mild Distress pathway        Resp Comments: (P) Pt w/ hx of COPD and uses inhalers and nebs at home.  Pt in no distress at this time.  Will cont w/ current therapy   "

## 2025-02-21 ENCOUNTER — APPOINTMENT (OUTPATIENT)
Dept: NON INVASIVE DIAGNOSTICS | Facility: HOSPITAL | Age: 65
DRG: 191 | End: 2025-02-21
Payer: COMMERCIAL

## 2025-02-21 LAB
AORTIC ROOT: 3.2 CM
ASCENDING AORTA: 4 CM
BSA FOR ECHO PROCEDURE: 2.68 M2
DOP CALC LVOT AREA: 5.72
DOP CALC LVOT DIAMETER: 2.7 CM
E WAVE DECELERATION TIME: 141 MS
E/A RATIO: 0.56
FRACTIONAL SHORTENING: 22 (ref 28–44)
INTERVENTRICULAR SEPTUM IN DIASTOLE (PARASTERNAL SHORT AXIS VIEW): 1.6 CM
INTERVENTRICULAR SEPTUM: 1.6 CM (ref 0.6–1.1)
LA/AORTA RATIO 2D: 1.28
LAAS-AP2: 30.3 CM2
LAAS-AP4: 21.5 CM2
LEFT ATRIUM SIZE: 4.1 CM
LEFT ATRIUM VOLUME (MOD BIPLANE): 95 ML
LEFT ATRIUM VOLUME INDEX (MOD BIPLANE): 35.4 ML/M2
LEFT INTERNAL DIMENSION IN SYSTOLE: 5.3 CM (ref 2.1–4)
LEFT VENTRICULAR INTERNAL DIMENSION IN DIASTOLE: 6.8 CM (ref 3.5–6)
LEFT VENTRICULAR POSTERIOR WALL IN END DIASTOLE: 1.4 CM
LEFT VENTRICULAR STROKE VOLUME: 104 ML
LV EF US.2D.A4C+ESTIMATED: 40 %
LVSV (TEICH): 104 ML
MV E'TISSUE VEL-LAT: 9 CM/S
MV E'TISSUE VEL-SEP: 5 CM/S
MV PEAK A VEL: 1.12 M/S
MV PEAK E VEL: 63 CM/S
MV STENOSIS PRESSURE HALF TIME: 41 MS
MV VALVE AREA P 1/2 METHOD: 5.37
RIGHT VENTRICLE ID DIMENSION: 4.4 CM
SL CV LV EF: 40
SL CV PED ECHO LEFT VENTRICLE DIASTOLIC VOLUME (MOD BIPLANE) 2D: 241 ML
SL CV PED ECHO LEFT VENTRICLE SYSTOLIC VOLUME (MOD BIPLANE) 2D: 137 ML
TRICUSPID ANNULAR PLANE SYSTOLIC EXCURSION: 3.2 CM

## 2025-02-21 PROCEDURE — 99232 SBSQ HOSP IP/OBS MODERATE 35: CPT | Performed by: INTERNAL MEDICINE

## 2025-02-21 PROCEDURE — 93306 TTE W/DOPPLER COMPLETE: CPT | Performed by: INTERNAL MEDICINE

## 2025-02-21 PROCEDURE — 94664 DEMO&/EVAL PT USE INHALER: CPT

## 2025-02-21 PROCEDURE — 94760 N-INVAS EAR/PLS OXIMETRY 1: CPT

## 2025-02-21 PROCEDURE — 94640 AIRWAY INHALATION TREATMENT: CPT

## 2025-02-21 PROCEDURE — 93306 TTE W/DOPPLER COMPLETE: CPT

## 2025-02-21 RX ADMIN — BUDESONIDE 0.5 MG: 0.5 INHALANT ORAL at 19:51

## 2025-02-21 RX ADMIN — LEVALBUTEROL HYDROCHLORIDE 1.25 MG: 1.25 SOLUTION RESPIRATORY (INHALATION) at 13:24

## 2025-02-21 RX ADMIN — ASPIRIN 81 MG 81 MG: 81 TABLET ORAL at 08:34

## 2025-02-21 RX ADMIN — DOXYCYCLINE HYCLATE 100 MG: 100 CAPSULE ORAL at 00:02

## 2025-02-21 RX ADMIN — BUDESONIDE 0.5 MG: 0.5 INHALANT ORAL at 07:18

## 2025-02-21 RX ADMIN — LEVALBUTEROL HYDROCHLORIDE 1.25 MG: 1.25 SOLUTION RESPIRATORY (INHALATION) at 19:51

## 2025-02-21 RX ADMIN — GUAIFENESIN 600 MG: 600 TABLET, EXTENDED RELEASE ORAL at 17:58

## 2025-02-21 RX ADMIN — GUAIFENESIN 600 MG: 600 TABLET, EXTENDED RELEASE ORAL at 08:34

## 2025-02-21 RX ADMIN — DOXYCYCLINE HYCLATE 100 MG: 100 CAPSULE ORAL at 11:54

## 2025-02-21 RX ADMIN — METHYLPREDNISOLONE SODIUM SUCCINATE 40 MG: 40 INJECTION, POWDER, FOR SOLUTION INTRAMUSCULAR; INTRAVENOUS at 04:50

## 2025-02-21 RX ADMIN — DOXYCYCLINE HYCLATE 100 MG: 100 CAPSULE ORAL at 23:39

## 2025-02-21 RX ADMIN — ENOXAPARIN SODIUM 40 MG: 40 INJECTION SUBCUTANEOUS at 06:27

## 2025-02-21 RX ADMIN — LEVALBUTEROL HYDROCHLORIDE 1.25 MG: 1.25 SOLUTION RESPIRATORY (INHALATION) at 07:19

## 2025-02-21 RX ADMIN — ENOXAPARIN SODIUM 40 MG: 40 INJECTION SUBCUTANEOUS at 21:49

## 2025-02-21 RX ADMIN — METHYLPREDNISOLONE SODIUM SUCCINATE 40 MG: 40 INJECTION, POWDER, FOR SOLUTION INTRAMUSCULAR; INTRAVENOUS at 21:49

## 2025-02-21 RX ADMIN — METHYLPREDNISOLONE SODIUM SUCCINATE 40 MG: 40 INJECTION, POWDER, FOR SOLUTION INTRAMUSCULAR; INTRAVENOUS at 11:54

## 2025-02-21 NOTE — RESPIRATORY THERAPY NOTE
RT Protocol Note  Armando Archer 65 y.o. male MRN: [id number]  Unit/Bed#: -01 Encounter: [id number]    Assessment    Principal Problem:    COPD with acute exacerbation (HCC)  Active Problems:    Elevated troponin      Home Pulmonary Medications:         Past Medical History:   Diagnosis Date    COPD (chronic obstructive pulmonary disease) (HCC)      Social History     Socioeconomic History    Marital status: /Civil Union     Spouse name: None    Number of children: None    Years of education: None    Highest education level: None   Occupational History    None   Tobacco Use    Smoking status: Former    Smokeless tobacco: Never   Substance and Sexual Activity    Alcohol use: Not Currently    Drug use: None    Sexual activity: None   Other Topics Concern    None   Social History Narrative    None     Social Drivers of Health     Financial Resource Strain: Not on file   Food Insecurity: No Food Insecurity (2/20/2025)    Nursing - Inadequate Food Risk Classification     Worried About Running Out of Food in the Last Year: Not on file     Ran Out of Food in the Last Year: Not on file     Ran Out of Food in the Last Year: Never true   Transportation Needs: No Transportation Needs (2/20/2025)    Nursing - Transportation Risk Classification     Lack of Transportation: Not on file     Lack of Transportation: No   Physical Activity: Not on file   Stress: Not on file   Social Connections: Unknown (6/18/2024)    Received from [facility]     How often do you feel lonely or isolated from those around you? (Adult - for ages 18 years and over): Not on file   Intimate Partner Violence: Unknown (2/20/2025)    Nursing IPS     Feels Physically and Emotionally Safe: Not on file     Physically Hurt by Someone: Not on file     Humiliated or Emotionally Abused by Someone: Not on file     Physically Hurt by Someone: No     Hurt or Threatened by Someone: No   Housing Stability: Unknown (2/20/2025)    Nursing:  "Inadequate Housing Risk Classification     Has Housing: Not on file     Worried About Losing Housing: Not on file     Unable to Get Utilities: Not on file     Unable to Pay for Housing in the Last Year: No     Has Housin       Subjective         Objective    Physical Exam:   Assessment Type: (P) During-treatment  General Appearance: (P) Awake, Alert  Respiratory Pattern: (P) Normal  Chest Assessment: (P) Chest expansion symmetrical  Bilateral Breath Sounds: (P) Diminished  Cough: (P) Non-productive, Strong  O2 Device: (P) nc    Vitals:  Blood pressure 135/79, pulse 89, temperature 97.5 °F (36.4 °C), resp. rate 18, height 6' 1\" (1.854 m), weight (!) 152 kg (335 lb 12.2 oz), SpO2 93%.          Imaging and other studies: Results Review Statement: No pertinent imaging studies reviewed.    O2 Device: (P) nc     Plan    Respiratory Plan: Mild Distress pathway        Resp Comments: (P) cont w/ current therapy   "

## 2025-02-21 NOTE — CASE MANAGEMENT
Case Management Assessment & Discharge Planning Note    Patient name Armando Archer  Location /-01 MRN [id number]  : 1960 Date 2025       Current Admission Date: 2025  Current Admission Diagnosis:COPD with acute exacerbation (HCC)   Patient Active Problem List    Diagnosis Date Noted Date Diagnosed    COPD with acute exacerbation (HCC) 2025     Elevated troponin 2025     Basal cell carcinoma of left side of neck 2016       LOS (days): 0  Geometric Mean LOS (GMLOS) (days): 2.1  Days to GMLOS:1.8     OBJECTIVE:    Risk of Unplanned Readmission Score: 8.24         Current admission status: Inpatient       Preferred Pharmacy:   [id number] [address] [address]  Phone: [phone] Fax: [phone]    [facility] [place] [place] - [address]  [address]  [address]  [facility] [address]  Phone: [phone] Fax: [phone]    Primary Care Provider: Randal Mari MD    Primary Insurance: [employer]  Secondary Insurance:     ASSESSMENT:  Active Health Care Proxies       Lola Archer Health Care Representative - Spouse   Primary Phone: [phone] (Mobile)                 Advance Directives  Does patient have a Health Care POA?: No  Was patient offered paperwork?: Yes (Declined, spouse to make decisions)  Does patient currently have a Health Care decision maker?: Yes, please see Health Care Proxy section  Does patient have Advance Directives?: No  Was patient offered paperwork?: Yes (Declined, spouse to make decisions)  Primary Contact: Lola Archer, Spouse         Readmission Root Cause  30 Day Readmission: No    Patient Information  Admitted from:: Home  Mental Status: Alert  During Assessment patient was accompanied by: Not accompanied during assessment  Assessment information provided by:: Patient  Primary Caregiver: Self  Support Systems:  Self, Spouse/significant other, Family members, Children  County of Residence: [place]  What city do you live in?: [place]  Home entry access options. Select all that apply.: Stairs  Number of steps to enter home.: 1  Do the steps have railings?: No  Type of Current Residence: 2 [place] home  Upon entering residence, is there a bedroom on the main floor (no further steps)?: Yes  Upon entering residence, is there a bathroom on the main floor (no further steps)?: Yes  Living Arrangements: Lives w/ Spouse/significant other  Is patient a ?: No    Activities of Daily Living Prior to Admission  Functional Status: Independent  Completes ADLs independently?: Yes  Ambulates independently?: Yes  Does patient use assisted devices?: No  Does patient currently own DME?: No  Does patient have a history of Outpatient Therapy (PT/OT)?: No  Does the patient have a history of Short-Term Rehab?: No  Does patient have a history of HHC?: No  Does patient currently have HHC?: No         Patient Information Continued  Income Source: Employed  Does patient have prescription coverage?: Yes  Does patient receive dialysis treatments?: No  Does patient have a history of substance abuse?: No  Does patient have a history of Mental Health Diagnosis?: No         Means of Transportation  Means of Transport to Appts:: Drives Self      Social Determinants of Health (SDOH)      Flowsheet Row Most Recent Value   Housing Stability    In the last 12 months, was there a time when you were not able to pay the mortgage or rent on time? N   In the past 12 months, how many times have you moved where you were living? 0   At any time in the past 12 months, were you homeless or living in a shelter (including now)? N   Transportation Needs    In the past 12 months, has lack of transportation kept you from medical appointments or from getting medications? no   In the past 12 months, has lack of transportation kept you from meetings, work, or from getting  things needed for daily living? No   Food Insecurity    Within the past 12 months, you worried that your food would run out before you got the money to buy more. Never true   Within the past 12 months, the food you bought just didn't last and you didn't have money to get more. Never true   Utilities    In the past 12 months has the electric, gas, oil, or water company threatened to shut off services in your home? No            DISCHARGE DETAILS:    Discharge planning discussed with:: Patient  Freedom of Choice: Yes  Comments - Freedom of Choice: CM discussed discharge planning and freedom of choice if services are recommended by [facility]. NO CM needs are anticipated at this time.  CM contacted family/caregiver?: No- see comments (Patient declined)  Were Treatment Team discharge recommendations reviewed with patient/caregiver?: Yes  Did patient/caregiver verbalize understanding of patient care needs?: Yes  Were patient/caregiver advised of the risks associated with not following Treatment Team discharge recommendations?: Yes    Requested Home Health Care         Is the patient interested in HHC at discharge?: No    DME Referral Provided  Referral made for DME?: No    Other Referral/Resources/Interventions Provided:  Interventions: None Indicated    Would you like to participate in our [facility] service program?  : No - Declined    Treatment Team Recommendation: Home  Discharge Destination Plan:: Home  Transport at Discharge : Family

## 2025-02-21 NOTE — ASSESSMENT & PLAN NOTE
Peak troponin: 64  Nonischemic myocardial injury secondary to COPD exacerbation  EKG showed evidence of nonspecific ST-T wave changes.    Echo pending  Plan for OP pharmacological stress testing in 4 weeks

## 2025-02-21 NOTE — PLAN OF CARE
Problem: INFECTION - ADULT  Goal: Absence or prevention of progression during hospitalization  Description: INTERVENTIONS:  - Assess and monitor for signs and symptoms of infection  - Monitor lab/diagnostic results  - Monitor all insertion sites, i.e. indwelling lines, tubes, and drains  - Monitor endotracheal if appropriate and nasal secretions for changes in amount and color  - [place] appropriate cooling/warming therapies per order  - Administer medications as ordered  - Instruct and encourage patient and family to use good hand hygiene technique  - Identify and instruct in appropriate isolation precautions for identified infection/condition  Outcome: Progressing     Problem: INFECTION - ADULT  Goal: Absence of fever/infection during neutropenic period  Description: INTERVENTIONS:  - Monitor WBC    Outcome: Progressing     Problem: Knowledge Deficit  Goal: Patient/family/caregiver demonstrates understanding of disease process, treatment plan, medications, and discharge instructions  Description: Complete learning assessment and assess knowledge base.  Interventions:  - Provide teaching at level of understanding  - Provide teaching via preferred learning methods  Outcome: Progressing     Problem: RESPIRATORY - ADULT  Goal: Achieves optimal ventilation and oxygenation  Description: INTERVENTIONS:  - Assess for changes in respiratory status  - Assess for changes in mentation and behavior  - Position to facilitate oxygenation and minimize respiratory effort  - Oxygen administered by appropriate delivery if ordered  - Initiate smoking cessation education as indicated  - Encourage broncho-pulmonary hygiene including cough, deep breathe, Incentive Spirometry  - Assess the need for suctioning and aspirate as needed  - Assess and instruct to report SOB or any respiratory difficulty  - Respiratory Therapy support as indicated  Outcome: Progressing     Problem: DISCHARGE PLANNING  Goal: Discharge to home or other  facility with appropriate resources  Description: INTERVENTIONS:  - Identify barriers to discharge w/patient and caregiver  - Arrange for needed discharge resources and transportation as appropriate  - Identify discharge learning needs (meds, wound care, etc.)  - Arrange for interpretive services to assist at discharge as needed  - Refer to Case Management Department for coordinating discharge planning if the patient needs post-hospital services based on physician/advanced practitioner order or complex needs related to functional status, cognitive ability, or social support system  Outcome: Progressing

## 2025-02-21 NOTE — PROGRESS NOTES
Progress Note - Cardiology   Name: Armando Archer 65 y.o. male I MRN: [id number]  Unit/Bed#: -01 I Date of Admission: 2/19/2025   Date of Service: 2/21/2025 I Hospital Day: 0    Assessment & Plan  Elevated troponin  Peak troponin: 64  Nonischemic myocardial injury secondary to COPD exacerbation  EKG showed evidence of nonspecific ST-T wave changes.    Echo pending  Plan for OP pharmacological stress testing in 4 weeks  COPD with acute exacerbation (HCC)  As per internal medicine team      Pending results of echocardiogram patient is stable from a cardiac standpoint, will sign off.  Call if needed.    Subjective   Chief Complaint: Im feeling better. Pt sitting on edge of bed eating lunch    Objective :  Temp:  [97.5 °F (36.4 °C)-97.9 °F (36.6 °C)] 97.8 °F (36.6 °C)  HR:  [] 83  BP: (135-169)/() 141/79  Resp:  [18] 18  SpO2:  [91 %-95 %] 94 %  O2 Device: Nasal cannula  Nasal Cannula O2 Flow Rate (L/min):  [2 L/min] 2 L/min  Orthostatic Blood Pressures      Flowsheet Row Most Recent Value   Blood Pressure 141/79 filed at 02/21/2025 0735   Patient Position - Orthostatic VS Sitting filed at 02/19/2025 2228          First Weight: Weight - Scale: (!) 141 kg (310 lb) (02/19/25 1601)  Vitals:    02/19/25 1601 02/19/25 2228   Weight: (!) 141 kg (310 lb) (!) 152 kg (335 lb 12.2 oz)     Physical Exam  Vitals and nursing note reviewed. Exam conducted with a chaperone present.   Constitutional:       Appearance: Normal appearance.   HENT:      Head: Normocephalic and atraumatic.   Cardiovascular:      Rate and Rhythm: Normal rate and regular rhythm.      Pulses: Normal pulses.      Heart sounds: Normal heart sounds.   Pulmonary:      Effort: Pulmonary effort is normal.      Comments: +decreased breath sounds  Musculoskeletal:         General: Swelling present. Normal range of motion.      Cervical back: Normal range of motion and neck supple.   Skin:     General: Skin is warm and dry.   Neurological:      General: No  "focal deficit present.      Mental Status: He is alert and oriented to person, place, and time.   Psychiatric:         Mood and Affect: Mood normal.         Behavior: Behavior normal.         Thought Content: Thought content normal.         Judgment: Judgment normal.           Lab Results: I have reviewed the following results:  Results from last 7 days   Lab Units 02/20/25  0454 02/19/25  1655   WBC Thousand/uL 10.33* 11.68*   HEMOGLOBIN g/dL 14.5 14.4   HEMATOCRIT % 44.9 44.3   PLATELETS Thousands/uL 160 165     Results from last 7 days   Lab Units 02/20/25  0454 02/19/25  1655 02/17/25  0858   POTASSIUM mmol/L 4.5 4.1 4.5   CHLORIDE mmol/L 104 105 100   CO2 mmol/L 28 27 33*   BUN mg/dL 23 23 21   CREATININE mg/dL 0.65 0.78 0.79   CALCIUM mg/dL 9.2 9.0 9.6         No results found for: \"HGBA1C\"  No results found for: \"CKTOTAL\", \"CKMB\", \"CKMBINDEX\", \"TROPONINI\"      "

## 2025-02-21 NOTE — ASSESSMENT & PLAN NOTE
Recently on steroids and Levaquin  [facility]  Will do doxycycline 100 mg twice daily  Clinically improving on IV steroids 40 every 8  Will wean to 40 every 12  If continues to improve possible transition to p.o. steroids tomorrow

## 2025-02-21 NOTE — PROGRESS NOTES
Progress Note - Hospitalist   Name: Armando Archer 65 y.o. male I MRN: [id number]  Unit/Bed#: -01 I Date of Admission: 2/19/2025   Date of Service: 2/21/2025 I Hospital Day: 0    Assessment & Plan  COPD with acute exacerbation (HCC)  Recently on steroids and Levaquin  Patient followed by [facility] pulmonology  Will do doxycycline 100 mg twice daily  Clinically improving on IV steroids 40 every 8  Will wean to 40 every 12  If continues to improve possible transition to p.o. steroids tomorrow  Elevated troponin  Mildly elevated troponin, no chest pain.  Appreciate cardiology evaluation  Planning for echo    VTE Pharmacologic Prophylaxis: VTE Score: 3 Moderate Risk (Score 3-4) - Pharmacological DVT Prophylaxis Ordered: enoxaparin (Lovenox).    Mobility:   Basic Mobility Inpatient Raw Score: 24  JH-HLM Goal: 8: Walk 250 feet or more  JH-HLM Achieved: 7: Walk 25 feet or more  JH-HLM Goal achieved. Continue to encourage appropriate mobility.    Patient Centered Rounds: I performed bedside rounds with nursing staff today.   Discussions with Specialists or Other Care Team Provider: cm, nursing    Education and Discussions with Family / Patient:  -pt, wife.     Current Length of Stay: 0 day(s)  Current Patient Status: Inpatient   Certification Statement: The patient will continue to require additional inpatient hospital stay due to see below  Discharge Plan:  Still requiring IV steroids.  Anticipate 24 to 48 hours    Code Status: Level 1 - Full Code    Subjective   Reports breathing improved.  Denies fever, chills, abdominal pain    Objective :  Temp:  [97.5 °F (36.4 °C)-97.9 °F (36.6 °C)] 97.8 °F (36.6 °C)  HR:  [] 83  BP: (135-169)/() 141/79  Resp:  [18] 18  SpO2:  [91 %-95 %] 93 %  O2 Device: Nasal cannula  Nasal Cannula O2 Flow Rate (L/min):  [2 L/min] 2 L/min    Body mass index is 44.3 kg/m².     Input and Output Summary (last 24 hours):     Intake/Output Summary (Last 24 hours) at 2/21/2025 [facility]  Last data  filed at 2/21/2025 0735  Gross per 24 hour   Intake 1220 ml   Output --   Net 1220 ml       Physical Exam  Constitutional:       General: He is not in acute distress.     Appearance: He is well-developed. He is not diaphoretic.   HENT:      Head: Normocephalic and atraumatic.      Nose: Nose normal.      Mouth/Throat:      Pharynx: No oropharyngeal exudate.   Eyes:      General: No scleral icterus.     Conjunctiva/sclera: Conjunctivae normal.   Cardiovascular:      Rate and Rhythm: Normal rate and regular rhythm.      Heart sounds: Normal heart sounds. No murmur heard.     No friction rub. No gallop.   Pulmonary:      Effort: Pulmonary effort is normal. No respiratory distress.      Breath sounds: Normal breath sounds. No wheezing or rales.   Chest:      Chest wall: No tenderness.   Abdominal:      General: Bowel sounds are normal. There is no distension.      Palpations: Abdomen is soft.      Tenderness: There is no abdominal tenderness. There is no guarding.   Musculoskeletal:         General: No tenderness or deformity. Normal range of motion.      Cervical back: Normal range of motion and neck supple.   Skin:     General: Skin is warm and dry.      Findings: No erythema.   Neurological:      Mental Status: He is alert. Mental status is at baseline.           Lines/Drains:        Telemetry:  Telemetry Orders (From admission, onward)               24 Hour Telemetry Monitoring  Continuous x 24 Hours (Telem)        Expiring   Question:  Reason for 24 Hour Telemetry  Answer:  PCI/EP study (including pacer and ICD implementation), Cardiac surgery, MI, abnormal cardiac cath, and chest pain- rule out MI                     Telemetry Reviewed: Normal Sinus Rhythm  Indication for Continued Telemetry Use: Acute MI/Unstable Angina/Rule out ACS               Lab Results: I have reviewed the following results:   Results from last 7 days   Lab Units 02/20/25  0454 02/19/25  1655   WBC Thousand/uL 10.33* 11.68*   HEMOGLOBIN  g/dL 14.5 14.4   HEMATOCRIT % 44.9 44.3   PLATELETS Thousands/uL 160 165   SEGS PCT %  --  83*   LYMPHO PCT %  --  9*   MONO PCT %  --  6   EOS PCT %  --  1     Results from last 7 days   Lab Units 02/20/25  0454 02/19/25  1655   SODIUM mmol/L 139 139   POTASSIUM mmol/L 4.5 4.1   CHLORIDE mmol/L 104 105   CO2 mmol/L 28 27   BUN mg/dL 23 23   CREATININE mg/dL 0.65 0.78   ANION GAP mmol/L 7 7   CALCIUM mg/dL 9.2 9.0   ALBUMIN g/dL  --  4.2   TOTAL BILIRUBIN mg/dL  --  0.37   ALK PHOS U/L  --  55   ALT U/L  --  52   AST U/L  --  33   GLUCOSE RANDOM mg/dL 162* 145*                       Recent Cultures (last 7 days):         Imaging Results Review: I personally reviewed the following image studies/reports in PACS and discussed pertinent findings with Radiology: chest xray. My interpretation of the radiology images/reports is:  .  Other Study Results Review: EKG was reviewed.     Last 24 Hours Medication List:     Current Facility-Administered Medications:     acetaminophen (TYLENOL) tablet 650 mg, Q6H PRN    aspirin chewable tablet 81 mg, Daily    budesonide (PULMICORT) inhalation solution 0.5 mg, Q12H    doxycycline hyclate (VIBRAMYCIN) capsule 100 mg, Q12H    enoxaparin (LOVENOX) subcutaneous injection 40 mg, Q12H    guaiFENesin (MUCINEX) 12 hr tablet 600 mg, BID    levalbuterol (XOPENEX) inhalation solution 1.25 mg, TID **AND** [DISCONTINUED] sodium chloride 0.9 % inhalation solution 3 mL, TID    methylPREDNISolone sodium succinate (Solu-MEDROL) injection 40 mg, Q8H    Administrative Statements   Today, Patient Was Seen By: Francesco Holder MD  I have spent a total time of 30 minutes in caring for this patient on the day of the visit/encounter including Diagnostic results.    **Please Note: This note may have been constructed using a voice recognition system.**

## 2025-02-21 NOTE — PLAN OF CARE
Problem: Potential for Falls  Goal: Patient will remain free of falls  Description: INTERVENTIONS:  - Educate patient/family on patient safety including physical limitations  - Instruct patient to call for assistance with activity   - Consult OT/PT to assist with strengthening/mobility   - Keep Call bell within reach  - Keep bed low and locked with side rails adjusted as appropriate  - Keep care items and personal belongings within reach  - Initiate and maintain comfort rounds  - Make Fall Risk Sign visible to staff  - Offer Toileting every 2 Hours, in advance of need  - Initiate/Maintain alarm  - Obtain necessary fall risk management equipment  - Apply yellow socks and bracelet for high fall risk patients  - Consider moving patient to room near nurses station  Outcome: Progressing     Problem: PAIN - ADULT  Goal: Verbalizes/displays adequate comfort level or baseline comfort level  Description: Interventions:  - Encourage patient to monitor pain and request assistance  - Assess pain using appropriate pain scale  - Administer analgesics based on type and severity of pain and evaluate response  - Implement non-pharmacological measures as appropriate and evaluate response  - Consider cultural and social influences on pain and pain management  - Notify physician/advanced practitioner if interventions unsuccessful or patient reports new pain  Outcome: Progressing     Problem: INFECTION - ADULT  Goal: Absence or prevention of progression during hospitalization  Description: INTERVENTIONS:  - Assess and monitor for signs and symptoms of infection  - Monitor lab/diagnostic results  - Monitor all insertion sites, i.e. indwelling lines, tubes, and drains  - Monitor endotracheal if appropriate and nasal secretions for changes in amount and color  - [place] appropriate cooling/warming therapies per order  - Administer medications as ordered  - Instruct and encourage patient and family to use good hand hygiene technique  -  Identify and instruct in appropriate isolation precautions for identified infection/condition  Outcome: Progressing  Goal: Absence of fever/infection during neutropenic period  Description: INTERVENTIONS:  - Monitor WBC    Outcome: Progressing     Problem: SAFETY ADULT  Goal: Patient will remain free of falls  Description: INTERVENTIONS:  - Educate patient/family on patient safety including physical limitations  - Instruct patient to call for assistance with activity   - Consult OT/PT to assist with strengthening/mobility   - Keep Call bell within reach  - Keep bed low and locked with side rails adjusted as appropriate  - Keep care items and personal belongings within reach  - Initiate and maintain comfort rounds  - Make Fall Risk Sign visible to staff  - Offer Toileting every 2 Hours, in advance of need  - Initiate/Maintain alarm  - Obtain necessary fall risk management equipment  - Apply yellow socks and bracelet for high fall risk patients  - Consider moving patient to room near nurses station  Outcome: Progressing  Goal: Maintain or return to baseline ADL function  Description: INTERVENTIONS:  -  Assess patient's ability to carry out ADLs; assess patient's baseline for ADL function and identify physical deficits which impact ability to perform ADLs (bathing, care of mouth/teeth, toileting, grooming, dressing, etc.)  - Assess/evaluate cause of self-care deficits   - Assess range of motion  - Assess patient's mobility; develop plan if impaired  - Assess patient's need for assistive devices and provide as appropriate  - Encourage maximum independence but intervene and supervise when necessary  - Involve family in performance of ADLs  - Assess for home care needs following discharge   - Consider OT consult to assist with ADL evaluation and planning for discharge  - Provide patient education as appropriate  Outcome: Progressing  Goal: Maintains/Returns to pre admission functional level  Description: INTERVENTIONS:  -  Perform AM-PAC 6 Click Basic Mobility/ Daily Activity assessment daily.  - Set and communicate daily mobility goal to care team and patient/family/caregiver.   - Collaborate with rehabilitation services on mobility goals if consulted  - Perform Range of Motion 3 times a day.  - Reposition patient every 3 hours.  - Dangle patient 3 times a day  - Stand patient 3 times a day  - Ambulate patient 3 times a day  - Out of bed to chair 3 times a day   - Out of bed for meals 3 times a day  - Out of bed for toileting  - Record patient progress and toleration of activity level   Outcome: Progressing     Problem: DISCHARGE PLANNING  Goal: Discharge to home or other facility with appropriate resources  Description: INTERVENTIONS:  - Identify barriers to discharge w/patient and caregiver  - Arrange for needed discharge resources and transportation as appropriate  - Identify discharge learning needs (meds, wound care, etc.)  - Arrange for interpretive services to assist at discharge as needed  - Refer to Case Management Department for coordinating discharge planning if the patient needs post-hospital services based on physician/advanced practitioner order or complex needs related to functional status, cognitive ability, or social support system  Outcome: Progressing     Problem: Knowledge Deficit  Goal: Patient/family/caregiver demonstrates understanding of disease process, treatment plan, medications, and discharge instructions  Description: Complete learning assessment and assess knowledge base.  Interventions:  - Provide teaching at level of understanding  - Provide teaching via preferred learning methods  Outcome: Progressing     Problem: RESPIRATORY - ADULT  Goal: Achieves optimal ventilation and oxygenation  Description: INTERVENTIONS:  - Assess for changes in respiratory status  - Assess for changes in mentation and behavior  - Position to facilitate oxygenation and minimize respiratory effort  - Oxygen administered by  appropriate delivery if ordered  - Initiate smoking cessation education as indicated  - Encourage broncho-pulmonary hygiene including cough, deep breathe, Incentive Spirometry  - Assess the need for suctioning and aspirate as needed  - Assess and instruct to report SOB or any respiratory difficulty  - Respiratory Therapy support as indicated  Outcome: Progressing

## 2025-02-22 PROBLEM — E66.01 MORBID OBESITY DUE TO EXCESS CALORIES (HCC): Status: ACTIVE | Noted: 2025-02-22

## 2025-02-22 PROBLEM — I42.9 CARDIOMYOPATHY (HCC): Status: ACTIVE | Noted: 2025-02-22

## 2025-02-22 LAB
ANION GAP SERPL CALCULATED.3IONS-SCNC: 6 MMOL/L (ref 4–13)
BASOPHILS # BLD AUTO: 0.03 THOUSANDS/ÂΜL (ref 0–0.1)
BASOPHILS NFR BLD AUTO: 0 % (ref 0–1)
BUN SERPL-MCNC: 23 MG/DL (ref 5–25)
CALCIUM SERPL-MCNC: 9.1 MG/DL (ref 8.4–10.2)
CHLORIDE SERPL-SCNC: 101 MMOL/L (ref 96–108)
CO2 SERPL-SCNC: 32 MMOL/L (ref 21–32)
CREAT SERPL-MCNC: 0.7 MG/DL (ref 0.6–1.3)
EOSINOPHIL # BLD AUTO: 0.01 THOUSAND/ÂΜL (ref 0–0.61)
EOSINOPHIL NFR BLD AUTO: 0 % (ref 0–6)
ERYTHROCYTE [DISTWIDTH] IN BLOOD BY AUTOMATED COUNT: 14.5 % (ref 11.6–15.1)
GFR SERPL CREATININE-BSD FRML MDRD: 99 ML/MIN/1.73SQ M
GLUCOSE SERPL-MCNC: 139 MG/DL (ref 65–140)
HCT VFR BLD AUTO: 44.7 % (ref 36.5–49.3)
HGB BLD-MCNC: 14.7 G/DL (ref 12–17)
IMM GRANULOCYTES # BLD AUTO: 0.16 THOUSAND/UL (ref 0–0.2)
IMM GRANULOCYTES NFR BLD AUTO: 1 % (ref 0–2)
LYMPHOCYTES # BLD AUTO: 2.13 THOUSANDS/ÂΜL (ref 0.6–4.47)
LYMPHOCYTES NFR BLD AUTO: 14 % (ref 14–44)
MCH RBC QN AUTO: 29.3 PG (ref 26.8–34.3)
MCHC RBC AUTO-ENTMCNC: 32.9 G/DL (ref 31.4–37.4)
MCV RBC AUTO: 89 FL (ref 82–98)
MONOCYTES # BLD AUTO: 0.65 THOUSAND/ÂΜL (ref 0.17–1.22)
MONOCYTES NFR BLD AUTO: 4 % (ref 4–12)
NEUTROPHILS # BLD AUTO: 12.55 THOUSANDS/ÂΜL (ref 1.85–7.62)
NEUTS SEG NFR BLD AUTO: 81 % (ref 43–75)
NRBC BLD AUTO-RTO: 0 /100 WBCS
PLATELET # BLD AUTO: 175 THOUSANDS/UL (ref 149–390)
PMV BLD AUTO: 9.4 FL (ref 8.9–12.7)
POTASSIUM SERPL-SCNC: 4.6 MMOL/L (ref 3.5–5.3)
RBC # BLD AUTO: 5.01 MILLION/UL (ref 3.88–5.62)
SODIUM SERPL-SCNC: 139 MMOL/L (ref 135–147)
WBC # BLD AUTO: 15.53 THOUSAND/UL (ref 4.31–10.16)

## 2025-02-22 PROCEDURE — 85025 COMPLETE CBC W/AUTO DIFF WBC: CPT | Performed by: INTERNAL MEDICINE

## 2025-02-22 PROCEDURE — 94664 DEMO&/EVAL PT USE INHALER: CPT

## 2025-02-22 PROCEDURE — 94640 AIRWAY INHALATION TREATMENT: CPT

## 2025-02-22 PROCEDURE — 80048 BASIC METABOLIC PNL TOTAL CA: CPT | Performed by: INTERNAL MEDICINE

## 2025-02-22 PROCEDURE — 99232 SBSQ HOSP IP/OBS MODERATE 35: CPT | Performed by: INTERNAL MEDICINE

## 2025-02-22 PROCEDURE — 94760 N-INVAS EAR/PLS OXIMETRY 1: CPT

## 2025-02-22 RX ORDER — CARVEDILOL 6.25 MG/1
6.25 TABLET ORAL 2 TIMES DAILY WITH MEALS
Status: DISCONTINUED | OUTPATIENT
Start: 2025-02-22 | End: 2025-02-24 | Stop reason: HOSPADM

## 2025-02-22 RX ORDER — METHYLPREDNISOLONE SODIUM SUCCINATE 40 MG/ML
40 INJECTION, POWDER, LYOPHILIZED, FOR SOLUTION INTRAMUSCULAR; INTRAVENOUS EVERY 12 HOURS SCHEDULED
Status: DISCONTINUED | OUTPATIENT
Start: 2025-02-22 | End: 2025-02-24

## 2025-02-22 RX ADMIN — ENOXAPARIN SODIUM 40 MG: 40 INJECTION SUBCUTANEOUS at 08:14

## 2025-02-22 RX ADMIN — METHYLPREDNISOLONE SODIUM SUCCINATE 40 MG: 40 INJECTION, POWDER, FOR SOLUTION INTRAMUSCULAR; INTRAVENOUS at 06:17

## 2025-02-22 RX ADMIN — ENOXAPARIN SODIUM 40 MG: 40 INJECTION SUBCUTANEOUS at 19:58

## 2025-02-22 RX ADMIN — CARVEDILOL 6.25 MG: 6.25 TABLET, FILM COATED ORAL at 17:15

## 2025-02-22 RX ADMIN — LEVALBUTEROL HYDROCHLORIDE 1.25 MG: 1.25 SOLUTION RESPIRATORY (INHALATION) at 07:25

## 2025-02-22 RX ADMIN — BUDESONIDE 0.5 MG: 0.5 INHALANT ORAL at 07:25

## 2025-02-22 RX ADMIN — METHYLPREDNISOLONE SODIUM SUCCINATE 40 MG: 40 INJECTION, POWDER, FOR SOLUTION INTRAMUSCULAR; INTRAVENOUS at 19:58

## 2025-02-22 RX ADMIN — BUDESONIDE 0.5 MG: 0.5 INHALANT ORAL at 19:36

## 2025-02-22 RX ADMIN — LEVALBUTEROL HYDROCHLORIDE 1.25 MG: 1.25 SOLUTION RESPIRATORY (INHALATION) at 13:26

## 2025-02-22 RX ADMIN — GUAIFENESIN 600 MG: 600 TABLET, EXTENDED RELEASE ORAL at 08:14

## 2025-02-22 RX ADMIN — DOXYCYCLINE HYCLATE 100 MG: 100 CAPSULE ORAL at 11:30

## 2025-02-22 RX ADMIN — DOXYCYCLINE HYCLATE 100 MG: 100 CAPSULE ORAL at 23:00

## 2025-02-22 RX ADMIN — LEVALBUTEROL HYDROCHLORIDE 1.25 MG: 1.25 SOLUTION RESPIRATORY (INHALATION) at 19:36

## 2025-02-22 RX ADMIN — ASPIRIN 81 MG 81 MG: 81 TABLET ORAL at 08:14

## 2025-02-22 RX ADMIN — GUAIFENESIN 600 MG: 600 TABLET, EXTENDED RELEASE ORAL at 17:15

## 2025-02-22 NOTE — ASSESSMENT & PLAN NOTE
Echocardiogram that was done yesterday showed evidence of ejection fraction of approximately 40%.  This is a new onset of cardiomyopathy.  He also was found to have the elevated troponins.  -Based upon these findings no indication for proceeding ahead with a pharmacological stress testing.  He will have an inpatient right and left heart cardiac catheterization on Monday morning.

## 2025-02-22 NOTE — RESPIRATORY THERAPY NOTE
RT Protocol Note  Armando Archer 65 y.o. male MRN: [id number]  Unit/Bed#: -01 Encounter: [id number]    Assessment    Principal Problem:    COPD with acute exacerbation (HCC)  Active Problems:    Elevated troponin      Home Pulmonary Medications:         Past Medical History:   Diagnosis Date    COPD (chronic obstructive pulmonary disease) (HCC)      Social History     Socioeconomic History    Marital status: /Civil Union     Spouse name: None    Number of children: None    Years of education: None    Highest education level: None   Occupational History    None   Tobacco Use    Smoking status: Former    Smokeless tobacco: Never   Substance and Sexual Activity    Alcohol use: Not Currently    Drug use: None    Sexual activity: None   Other Topics Concern    None   Social History Narrative    None     Social Drivers of Health     Financial Resource Strain: Not on file   Food Insecurity: No Food Insecurity (2/21/2025)    Hunger Vital Sign     Worried About Running Out of Food in the Last Year: Never true     Ran Out of Food in the Last Year: Never true   Transportation Needs: No Transportation Needs (2/21/2025)    PRAPARE - Transportation     Lack of Transportation (Medical): No     Lack of Transportation (Non-Medical): No   Physical Activity: Not on file   Stress: Not on file   Social Connections: Unknown (6/18/2024)    Received from [facility]     How often do you feel lonely or isolated from those around you? (Adult - for ages 18 years and over): Not on file   Intimate Partner Violence: Unknown (2/20/2025)    Nursing IPS     Feels Physically and Emotionally Safe: Not on file     Physically Hurt by Someone: Not on file     Humiliated or Emotionally Abused by Someone: Not on file     Physically Hurt by Someone: No     Hurt or Threatened by Someone: No   Housing Stability: Low Risk  (2/21/2025)    Housing Stability Vital Sign     Unable to Pay for Housing in the Last Year: No     Number of  "Times Moved in the Last Year: 0     Homeless in the Last Year: No       Subjective         Objective    Physical Exam:   Assessment Type: During-treatment  General Appearance: Alert, Awake  Respiratory Pattern: Normal  Chest Assessment: Chest expansion symmetrical  Bilateral Breath Sounds: Diminished  O2 Device: nc    Vitals:  Blood pressure 153/87, pulse 97, temperature (!) 97.4 °F (36.3 °C), resp. rate 16, height 6' 1\" (1.854 m), weight (!) 152 kg (335 lb), SpO2 92%.          Imaging and other studies: Results Review Statement: No pertinent imaging studies reviewed.    O2 Device: nc     Plan    Respiratory Plan: Mild Distress pathway        Resp Comments: no distress. bs diminished some rhi. spo2 on 2L NC 94%. continue tx's as ordered.   "

## 2025-02-22 NOTE — UTILIZATION REVIEW
Initial Clinical Review - Continued    SEE INITIAL REVIEW AT BOTTOM    Date: 02/22/25                          Current Patient Class: Inpatient  Current Level of Care: Med/Surg    HPI:65 y.o. male initially admitted on 2/19/2025 for COPD with acute exacerbation (HCC).      Assessment/Plan:   02/22/25 Day 2: Remains on 2L O2 NC - baseline room air. Exam: diminished breath sounds w/ expiratory wheezing. Telemetry: NSR. WBC 15.53. Plan: telemetry, nebs, change IV solu-medrol 40 mg from q8h to q12h. PO vibramycin. I&O, Trend labs, replete electrolytes as needed. Pending stress test s/t new onset cardiomyopathy w/ EF 40%.     Medications:   Scheduled Medications:  aspirin, 81 mg, Oral, Daily  budesonide, 0.5 mg, Nebulization, Q12H  doxycycline hyclate, 100 mg, Oral, Q12H  enoxaparin, 40 mg, Subcutaneous, Q12H  guaiFENesin, 600 mg, Oral, BID  levalbuterol, 1.25 mg, Nebulization, TID  methylPREDNISolone sodium succinate, 40 mg, Intravenous, Q12H MEREDITH    Continuous IV Infusions: none    PRN Meds:  acetaminophen, 650 mg, Oral, Q6H PRN        Vital Signs:   Vital Signs (last 3 days)       Date/Time Temp Pulse Resp BP MAP (mmHg) SpO2 Calculated FIO2 (%) - Nasal Cannula Nasal Cannula O2 Flow Rate (L/min) O2 Device Patient Position - Orthostatic VS Pain    02/22/25 0747 -- 89 -- -- -- 93 % -- -- -- -- --    02/22/25 0725 -- 97 16 -- -- 92 % 28 2 L/min Nasal cannula -- --    02/22/25 07:24:03 97.4 °F (36.3 °C) 81 16 153/87 109 94 % -- -- -- -- --    02/21/25 2257 97.3 °F (36.3 °C) 77 -- 154/87 109 95 % -- -- -- -- No Pain    02/21/25 2200 -- -- -- -- -- -- 28 2 L/min Nasal cannula -- --    02/21/25 1952 -- -- -- -- -- 94 % 28 2 L/min Nasal cannula -- --    02/21/25 19:46:57 97.3 °F (36.3 °C) -- -- 166/96 119 -- -- -- -- -- --    02/21/25 15:42:44 97.4 °F (36.3 °C) 97 -- 165/96 119 93 % -- -- -- -- --    02/21/25 1435 -- 83 -- 141/79 -- -- -- -- -- -- --    02/21/25 1325 -- -- -- -- -- 94 % 28 2 L/min Nasal cannula -- --     02/21/25 1100 -- -- -- -- -- 94 % 28 2 L/min Nasal cannula -- No Pain    02/21/25 07:35:42 97.8 °F (36.6 °C) 83 18 141/79 100 93 % -- -- -- -- --    02/21/25 0719 -- -- -- -- -- 93 % 28 2 L/min Nasal cannula -- --    02/21/25 04:56:31 -- 89 -- 135/79 98 95 % -- -- -- -- --    02/21/25 03:30:25 97.5 °F (36.4 °C) 90 18 164/86 112 93 % -- -- -- -- --    02/21/25 01:07:47 -- 89 -- 166/84 111 93 % -- -- -- -- --    02/20/25 22:34:02 97.9 °F (36.6 °C) 98 18 169/101 124 93 % -- -- -- -- --    02/20/25 1930 -- -- -- -- -- -- 28 2 L/min Nasal cannula -- No Pain    02/20/25 18:58:47 97.9 °F (36.6 °C) 101 18 154/83 107 91 % -- -- -- -- --    02/20/25 1818 -- -- -- -- -- 93 % 28 2 L/min Nasal cannula -- --    02/20/25 14:57:11 97.6 °F (36.4 °C) 93 18 158/87 111 93 % -- -- -- -- --    02/20/25 1333 -- -- -- -- -- 92 % -- -- None (Room air) -- --    02/20/25 1000 -- -- -- -- -- -- -- -- -- -- No Pain    02/20/25 07:45:41 97.6 °F (36.4 °C) 84 19 163/88 113 92 % 28 2 L/min Nasal cannula -- --    02/20/25 0718 -- -- -- -- -- 94 % 28 2 L/min Nasal cannula -- --    02/20/25 03:25:34 97.8 °F (36.6 °C) 93 20 168/93 118 93 % -- -- -- -- --    02/19/25 23:52:12 -- 93 -- 172/94 120 94 % -- -- -- -- --    02/19/25 2330 -- -- -- -- -- -- 28 2 L/min Nasal cannula -- No Pain    02/19/25 22:28:05 98.4 °F (36.9 °C) 92 18 180/98 125 94 % -- -- None (Room air) Sitting No Pain    02/19/25 2141 -- -- -- -- -- -- -- -- -- -- 4    02/19/25 2115 -- 91 20 188/95 129 95 % -- -- -- -- --    02/19/25 2000 -- 92 20 185/90 127 93 % -- -- -- -- --    02/19/25 1900 -- 96 20 173/67 97 94 % -- -- -- -- --    02/19/25 1815 -- 92 20 172/83 119 96 % -- -- -- -- --    02/19/25 1745 -- 91 19 153/72 104 94 % 28 2 L/min Nasal cannula Sitting --    02/19/25 1601 98.2 °F (36.8 °C) 118 18 196/91 131 93 % -- -- None (Room air) Sitting --            Pertinent Labs/Diagnostic Results:   Cardiology:  Echo complete w/ contrast if indicated   Final Result by Bárbara Mason,  MD (02/21 1617)        Left Ventricle: Left ventricular cavity size is mildly dilated. Wall    thickness is increased. There is mild concentric hypertrophy. The left    ventricular ejection fraction is 40%. Systolic function is mildly reduced.    There is mild global hypokinesis. Diastolic function is mildly abnormal,    consistent with grade I (abnormal) relaxation.     Right Ventricle: Right ventricular cavity size is mildly dilated.     Left Atrium: The atrium is mildly dilated.     Right Atrium: The atrium is mildly dilated.     Mitral Valve: There is mild regurgitation.     Aorta: The aortic root is normal in size. The ascending aorta is mildly    dilated.4.0 cm.         ECG 12 lead   Final Result by Charito Mathur MD (02/20 0881)   Normal sinus rhythm   Right bundle branch block   Septal infarct , age undetermined   Abnormal ECG   No previous ECGs available   Confirmed by Charito Mathur ([id number]) on 2/20/2025 5:31:36 PM            Results from last 7 days   Lab Units 02/22/25 0627 02/20/25 0454 02/19/25  1655   WBC Thousand/uL 15.53* 10.33* 11.68*   HEMOGLOBIN g/dL 14.7 14.5 14.4   HEMATOCRIT % 44.7 44.9 44.3   PLATELETS Thousands/uL 175 160 165   TOTAL NEUT ABS Thousands/µL 12.55*  --  9.77*         Results from last 7 days   Lab Units 02/22/25 0627 02/20/25 0454 02/19/25  1655 02/17/25  0858   SODIUM mmol/L 139 139 139 140   POTASSIUM mmol/L 4.6 4.5 4.1 4.5   CHLORIDE mmol/L 101 104 105 100   CO2 mmol/L 32 28 27 33*   ANION GAP mmol/L 6 7 7 7   BUN mg/dL 23 23 23 21   CREATININE mg/dL 0.70 0.65 0.78 0.79   EGFR ml/min/1.73sq m 99 102 95 99   CALCIUM mg/dL 9.1 9.2 9.0 9.6     Results from last 7 days   Lab Units 02/22/25 0627 02/20/25 0454 02/19/25  1655 02/17/25  0858   GLUCOSE RANDOM mg/dL 139 162* 145* 97         Network Utilization Review Department  ATTENTION: Please call with any questions or concerns to [phone] and carefully listen to the prompts so that you are directed to the right person.  All voicemails are confidential.   For Discharge needs, contact Care Management DC Support Team at [phone] opt. 2  Send all requests for admission clinical reviews, approved or denied determinations and any other requests to dedicated fax number below belonging to the campus where the patient is receiving treatment. List of dedicated fax numbers for the Facilities:  FACILITY NAME UR FAX NUMBER   ADMISSION DENIALS (Administrative/Medical Necessity) [phone]   DISCHARGE SUPPORT TEAM (NETWORK) [phone]   PARENT CHILD HEALTH (Maternity/NICU/Pediatrics) [phone]   General acute hospital [phone]   [facility] [phone]   [facility] [phone]   [facility] [phone]   [facility] [phone]   [facility] [phone]   [facility] [phone]   [facility] [phone]   [facility] [phone]   [facility] [phone]   [facility] [phone]   [facility] [phone]

## 2025-02-22 NOTE — PLAN OF CARE
Problem: Potential for Falls  Goal: Patient will remain free of falls  Description: INTERVENTIONS:  - Educate patient/family on patient safety including physical limitations  - Instruct patient to call for assistance with activity   - Consult OT/PT to assist with strengthening/mobility   - Keep Call bell within reach  - Keep bed low and locked with side rails adjusted as appropriate  - Keep care items and personal belongings within reach  - Initiate and maintain comfort rounds  - Make Fall Risk Sign visible to staff  - Offer Toileting every  Hours, in advance of need  - Initiate/Maintain alarm  - Obtain necessary fall risk management equipment:   - Apply yellow socks and bracelet for high fall risk patients  - Consider moving patient to room near nurses station  Outcome: Progressing     Problem: PAIN - ADULT  Goal: Verbalizes/displays adequate comfort level or baseline comfort level  Description: Interventions:  - Encourage patient to monitor pain and request assistance  - Assess pain using appropriate pain scale  - Administer analgesics based on type and severity of pain and evaluate response  - Implement non-pharmacological measures as appropriate and evaluate response  - Consider cultural and social influences on pain and pain management  - Notify physician/advanced practitioner if interventions unsuccessful or patient reports new pain  Outcome: Progressing     Problem: INFECTION - ADULT  Goal: Absence or prevention of progression during hospitalization  Description: INTERVENTIONS:  - Assess and monitor for signs and symptoms of infection  - Monitor lab/diagnostic results  - Monitor all insertion sites, i.e. indwelling lines, tubes, and drains  - Monitor endotracheal if appropriate and nasal secretions for changes in amount and color  - [place] appropriate cooling/warming therapies per order  - Administer medications as ordered  - Instruct and encourage patient and family to use good hand hygiene technique  -  Identify and instruct in appropriate isolation precautions for identified infection/condition  Outcome: Progressing  Goal: Absence of fever/infection during neutropenic period  Description: INTERVENTIONS:  - Monitor WBC    Outcome: Progressing     Problem: SAFETY ADULT  Goal: Patient will remain free of falls  Description: INTERVENTIONS:  - Educate patient/family on patient safety including physical limitations  - Instruct patient to call for assistance with activity   - Consult OT/PT to assist with strengthening/mobility   - Keep Call bell within reach  - Keep bed low and locked with side rails adjusted as appropriate  - Keep care items and personal belongings within reach  - Initiate and maintain comfort rounds  - Make Fall Risk Sign visible to staff  - Offer Toileting every  Hours, in advance of need  - Initiate/Maintain alarm  - Obtain necessary fall risk management equipment:   - Apply yellow socks and bracelet for high fall risk patients  - Consider moving patient to room near nurses station  Outcome: Progressing  Goal: Maintain or return to baseline ADL function  Description: INTERVENTIONS:  -  Assess patient's ability to carry out ADLs; assess patient's baseline for ADL function and identify physical deficits which impact ability to perform ADLs (bathing, care of mouth/teeth, toileting, grooming, dressing, etc.)  - Assess/evaluate cause of self-care deficits   - Assess range of motion  - Assess patient's mobility; develop plan if impaired  - Assess patient's need for assistive devices and provide as appropriate  - Encourage maximum independence but intervene and supervise when necessary  - Involve family in performance of ADLs  - Assess for home care needs following discharge   - Consider OT consult to assist with ADL evaluation and planning for discharge  - Provide patient education as appropriate  Outcome: Progressing  Goal: Maintains/Returns to pre admission functional level  Description:  INTERVENTIONS:  - Perform AM-PAC 6 Click Basic Mobility/ Daily Activity assessment daily.  - Set and communicate daily mobility goal to care team and patient/family/caregiver.   - Collaborate with rehabilitation services on mobility goals if consulted  - Perform Range of Motion  times a day.  - Reposition patient every  hours.  - Dangle patient  times a day  - Stand patient  times a day  - Ambulate patient  times a day  - Out of bed to chair  times a day   - Out of bed for meals  times a day  - Out of bed for toileting  - Record patient progress and toleration of activity level   Outcome: Progressing     Problem: DISCHARGE PLANNING  Goal: Discharge to home or other facility with appropriate resources  Description: INTERVENTIONS:  - Identify barriers to discharge w/patient and caregiver  - Arrange for needed discharge resources and transportation as appropriate  - Identify discharge learning needs (meds, wound care, etc.)  - Arrange for interpretive services to assist at discharge as needed  - Refer to Case Management Department for coordinating discharge planning if the patient needs post-hospital services based on physician/advanced practitioner order or complex needs related to functional status, cognitive ability, or social support system  Outcome: Progressing     Problem: Knowledge Deficit  Goal: Patient/family/caregiver demonstrates understanding of disease process, treatment plan, medications, and discharge instructions  Description: Complete learning assessment and assess knowledge base.  Interventions:  - Provide teaching at level of understanding  - Provide teaching via preferred learning methods  Outcome: Progressing     Problem: RESPIRATORY - ADULT  Goal: Achieves optimal ventilation and oxygenation  Description: INTERVENTIONS:  - Assess for changes in respiratory status  - Assess for changes in mentation and behavior  - Position to facilitate oxygenation and minimize respiratory effort  - Oxygen  administered by appropriate delivery if ordered  - Initiate smoking cessation education as indicated  - Encourage broncho-pulmonary hygiene including cough, deep breathe, Incentive Spirometry  - Assess the need for suctioning and aspirate as needed  - Assess and instruct to report SOB or any respiratory difficulty  - Respiratory Therapy support as indicated  Outcome: Progressing     Problem: CARDIOVASCULAR - ADULT  Goal: Maintains optimal cardiac output and hemodynamic stability  Description: INTERVENTIONS:  - Monitor I/O, vital signs and rhythm  - Monitor for S/S and trends of decreased cardiac output  - Administer and titrate ordered vasoactive medications to optimize hemodynamic stability  - Assess quality of pulses, skin color and temperature  - Assess for signs of decreased coronary artery perfusion  - Instruct patient to report change in severity of symptoms  Outcome: Progressing  Goal: Absence of cardiac dysrhythmias or at baseline rhythm  Description: INTERVENTIONS:  - Continuous cardiac monitoring, vital signs, obtain 12 lead EKG if ordered  - Administer antiarrhythmic and heart rate control medications as ordered  - Monitor electrolytes and administer replacement therapy as ordered  Outcome: Progressing

## 2025-02-22 NOTE — PROGRESS NOTES
Progress Note - Hospitalist   Name: Armando Archer 65 y.o. male I MRN: [id number]  Unit/Bed#: -01 I Date of Admission: 2/19/2025   Date of Service: 2/22/2025 I Hospital Day: 1    Assessment & Plan  COPD with acute exacerbation (HCC)  Recently on steroids and Levaquin  [facility]  Will do doxycycline 100 mg twice daily  Clinically improving on IV steroids 40 every 12 hours  If continues to improve can likely be transition to p.o. steroids tomorrow    Elevated troponin  Mildly elevated troponin, no chest pain.  Appreciate cardiology evaluation  Plan as outlined under cardiomyopathy  Morbid obesity due to excess calories (HCC)  As noted by BMI of 44  Diet exercise counseling provided  Cardiomyopathy (HCC)  Noted to have a EF of 40% new onset  Planning for cardiac stress test  Likely on Monday    VTE Pharmacologic Prophylaxis: VTE Score: 3 Moderate Risk (Score 3-4) - Pharmacological DVT Prophylaxis Ordered: enoxaparin (Lovenox).    Mobility:   Basic Mobility Inpatient Raw Score: 24  JH-HLM Goal: 8: Walk 250 feet or more  JH-HLM Achieved: 8: Walk 250 feet ot more  JH-HLM Goal achieved. Continue to encourage appropriate mobility.    Patient Centered Rounds: I performed bedside rounds with nursing staff today.   Discussions with Specialists or Other Care Team Provider: cm, nursing    Education and Discussions with Family / Patient: Patient declined call to .     Current Length of Stay: 1 day(s)  Current Patient Status: Inpatient   Certification Statement: The patient will continue to require additional inpatient hospital stay due to see below  Discharge Plan:  Pending stress test.  Anticipate approximate 48 hours    Code Status: Level 1 - Full Code    Subjective   Denies chest pain, shortness of breath, cough, fevers, chills    Objective :  Temp:  [97.3 °F (36.3 °C)-97.4 °F (36.3 °C)] 97.4 °F (36.3 °C)  HR:  [77-97] 89  BP: (141-166)/(79-96) 153/87  Resp:  [16] 16  SpO2:  [92 %-95  %] 93 %  O2 Device: Nasal cannula  Nasal Cannula O2 Flow Rate (L/min):  [2 L/min] 2 L/min    Body mass index is 44.2 kg/m².     Input and Output Summary (last 24 hours):     Intake/Output Summary (Last 24 hours) at 2/22/2025 1023  Last data filed at 2/22/2025 0901  Gross per 24 hour   Intake 360 ml   Output --   Net 360 ml       Physical Exam  Constitutional:       General: He is not in acute distress.     Appearance: He is well-developed. He is not diaphoretic.   HENT:      Head: Normocephalic and atraumatic.      Nose: Nose normal.      Mouth/Throat:      Pharynx: No oropharyngeal exudate.   Eyes:      General: No scleral icterus.        Right eye: No discharge.         Left eye: No discharge.   Neck:      Vascular: No JVD.   Cardiovascular:      Rate and Rhythm: Normal rate and regular rhythm.      Heart sounds: Normal heart sounds. No murmur heard.     No friction rub. No gallop.   Pulmonary:      Effort: Pulmonary effort is normal. No respiratory distress.      Breath sounds: Normal breath sounds. No wheezing or rales.   Chest:      Chest wall: No tenderness.   Abdominal:      General: Bowel sounds are normal. There is no distension.      Palpations: Abdomen is soft. There is no mass.      Tenderness: There is no abdominal tenderness. There is no guarding or rebound.   Musculoskeletal:         General: No tenderness or deformity. Normal range of motion.      Cervical back: Normal range of motion and neck supple.   Lymphadenopathy:      Cervical: No cervical adenopathy.   Skin:     General: Skin is warm and dry.      Coloration: Skin is not pale.      Findings: No erythema or rash.   Neurological:      Mental Status: He is alert. Mental status is at baseline.      Deep Tendon Reflexes: Reflexes are normal and symmetric.   Psychiatric:         Behavior: Behavior normal.           Lines/Drains:        Telemetry:  Telemetry Orders (From admission, onward)               24 Hour Telemetry Monitoring  Continuous x 24  Hours (Telem)        Expiring   Question:  Reason for 24 Hour Telemetry  Answer:  PCI/EP study (including pacer and ICD implementation), Cardiac surgery, MI, abnormal cardiac cath, and chest pain- rule out MI                     Telemetry Reviewed: Normal Sinus Rhythm  Indication for Continued Telemetry Use: Acute MI/Unstable Angina/Rule out ACS               Lab Results: I have reviewed the following results:   Results from last 7 days   Lab Units 02/22/25  0627   WBC Thousand/uL 15.53*   HEMOGLOBIN g/dL 14.7   HEMATOCRIT % 44.7   PLATELETS Thousands/uL 175   SEGS PCT % 81*   LYMPHO PCT % 14   MONO PCT % 4   EOS PCT % 0     Results from last 7 days   Lab Units 02/22/25  0627 02/20/25  0454 02/19/25  1655   SODIUM mmol/L 139   < > 139   POTASSIUM mmol/L 4.6   < > 4.1   CHLORIDE mmol/L 101   < > 105   CO2 mmol/L 32   < > 27   BUN mg/dL 23   < > 23   CREATININE mg/dL 0.70   < > 0.78   ANION GAP mmol/L 6   < > 7   CALCIUM mg/dL 9.1   < > 9.0   ALBUMIN g/dL  --   --  4.2   TOTAL BILIRUBIN mg/dL  --   --  0.37   ALK PHOS U/L  --   --  55   ALT U/L  --   --  52   AST U/L  --   --  33   GLUCOSE RANDOM mg/dL 139   < > 145*    < > = values in this interval not displayed.                       Recent Cultures (last 7 days):         Imaging Results Review: I personally reviewed the following image studies/reports in PACS and discussed pertinent findings with Radiology: chest xray. My interpretation of the radiology images/reports is:  .  Other Study Results Review: EKG was reviewed.     Last 24 Hours Medication List:     Current Facility-Administered Medications:     acetaminophen (TYLENOL) tablet 650 mg, Q6H PRN    aspirin chewable tablet 81 mg, Daily    budesonide (PULMICORT) inhalation solution 0.5 mg, Q12H    doxycycline hyclate (VIBRAMYCIN) capsule 100 mg, Q12H    enoxaparin (LOVENOX) subcutaneous injection 40 mg, Q12H    guaiFENesin (MUCINEX) 12 hr tablet 600 mg, BID    levalbuterol (XOPENEX) inhalation solution 1.25 mg,  TID **AND** [DISCONTINUED] sodium chloride 0.9 % inhalation solution 3 mL, TID    methylPREDNISolone sodium succinate (Solu-MEDROL) injection 40 mg, Q12H MEREDITH    Administrative Statements   Today, Patient Was Seen By: Francesco Holder MD  I have spent a total time of 30 minutes in caring for this patient on the day of the visit/encounter including Diagnostic results.    **Please Note: This note may have been constructed using a voice recognition system.**

## 2025-02-22 NOTE — ASSESSMENT & PLAN NOTE
Recently on steroids and Levaquin  Patient followed by [facility] pulmonology  Will do doxycycline 100 mg twice daily  Clinically improving on IV steroids 40 every 12 hours  If continues to improve can likely be transition to p.o. steroids tomorrow

## 2025-02-22 NOTE — PROGRESS NOTES
"Cardiology Progress Note - Armando Archer 65 y.o. male MRN: [id number]    Unit/Bed#: -01 Encounter: [id number]      Assessment/Plan: Armando Archer admitted with the complaints of shortness of breath, etiology underlying COPD exacerbation also found to have the elevated troponins.  Echocardiogram that was performed yesterday showed evidence of ejection fraction of 40%.  -He has been started on the Coreg 6.25 mg twice daily.  Plan to consider the ACE/ARB inhibitors after the cardiac catheterization has been done that has been tentatively going to be scheduled for Monday morning.    Assessment & Plan  Elevated troponin  Echocardiogram that was done yesterday showed evidence of ejection fraction of approximately 40%.  This is a new onset of cardiomyopathy.  He also was found to have the elevated troponins.  -Based upon these findings no indication for proceeding ahead with a pharmacological stress testing.  He will have an inpatient right and left heart cardiac catheterization on Monday morning.  COPD with acute exacerbation (HCC)  As per internal medicine team  Morbid obesity due to excess calories (HCC)    Cardiomyopathy (HCC)  New onset of cardiomyopathy.  Have started him today on Coreg 6.25 mg twice daily, the dose of the Coreg can be increased depending upon the blood pressure response.    -After the cardiac catheterization has been done, we will consider the ACE/ARB inhibitors      Subjective:   Patient seen and examined.  No overnight hemodynamic disturbances noted.  Comfortably in the chair.  No evidence of having any chest pain.    Objective:     Vitals: Blood pressure 153/87, pulse 89, temperature (!) 97.4 °F (36.3 °C), resp. rate 16, height 6' 1\" (1.854 m), weight (!) 152 kg (335 lb), SpO2 93%., Body mass index is 44.2 kg/m².,   Orthostatic Blood Pressures      Flowsheet Row Most Recent Value   Blood Pressure 153/87 filed at 02/22/2025 9424   Patient Position - Orthostatic VS Sitting filed at 02/19/2025 2228 "              Intake/Output Summary (Last 24 hours) at 2/22/2025 1118  Last data filed at 2/22/2025 0901  Gross per 24 hour   Intake 360 ml   Output --   Net 360 ml         Physical Exam  Constitutional:       Appearance: Normal appearance.   Cardiovascular:      Pulses: Normal pulses.      Heart sounds: No murmur heard.  Pulmonary:      Effort: Pulmonary effort is normal.      Breath sounds: No wheezing.      Comments: Breath sounds are diminished at the bases.  Chest:      Chest wall: No tenderness.   Musculoskeletal:      Cervical back: Neck supple.   Skin:     General: Skin is warm.   Neurological:      General: No focal deficit present.      Mental Status: He is alert.             Telemetry:  Telemetry Orders (From admission, onward)               24 Hour Telemetry Monitoring  Continuous x 24 Hours (Telem)        Expiring   Question:  Reason for 24 Hour Telemetry  Answer:  PCI/EP study (including pacer and ICD implementation), Cardiac surgery, MI, abnormal cardiac cath, and chest pain- rule out MI                     Telemetry Reviewed: Normal Sinus Rhythm  Indication for Continued Telemetry Use: Acute respiratory failure on Bipap    Medications:      Current Facility-Administered Medications:     acetaminophen (TYLENOL) tablet 650 mg, 650 mg, Oral, Q6H PRN, Rolf Perez MD    aspirin chewable tablet 81 mg, 81 mg, Oral, Daily, Rolf Perez MD, 81 mg at 02/22/25 0814    budesonide (PULMICORT) inhalation solution 0.5 mg, 0.5 mg, Nebulization, Q12H, Francesco Holder MD, 0.5 mg at 02/22/25 0725    doxycycline hyclate (VIBRAMYCIN) capsule 100 mg, 100 mg, Oral, Q12H, Rolf Perez MD, 100 mg at 02/21/25 2339    enoxaparin (LOVENOX) subcutaneous injection 40 mg, 40 mg, Subcutaneous, Q12H, Rolf Perez MD, 40 mg at 02/22/25 0814    guaiFENesin (MUCINEX) 12 hr tablet 600 mg, 600 mg, Oral, BID, Rolf Perez MD, 600 mg at 02/22/25 0814    levalbuterol (XOPENEX) inhalation solution 1.25  mg, 1.25 mg, Nebulization, TID, 1.25 mg at 02/22/25 0725 **AND** [DISCONTINUED] sodium chloride 0.9 % inhalation solution 3 mL, 3 mL, Nebulization, TID, Rolf Perez MD    methylPREDNISolone sodium succinate (Solu-MEDROL) injection 40 mg, 40 mg, Intravenous, Q12H MEREDITH, Francesco Holder MD     Labs & Results:    Results from last 7 days   Lab Units 02/19/25  2105 02/19/25  1900 02/19/25  1655   HS TNI 0HR ng/L  --   --  52*   HS TNI 2HR ng/L  --  59*  --    HS TNI 4HR ng/L 64*  --   --    HSTNI D4 ng/L 12  --   --      Results from last 7 days   Lab Units 02/22/25  0627 02/20/25  0454 02/19/25  1655   WBC Thousand/uL 15.53* 10.33* 11.68*   HEMOGLOBIN g/dL 14.7 14.5 14.4   HEMATOCRIT % 44.7 44.9 44.3   PLATELETS Thousands/uL 175 160 165         Results from last 7 days   Lab Units 02/22/25  0627 02/20/25  0454 02/19/25  1655 02/17/25  0858   POTASSIUM mmol/L 4.6 4.5 4.1 4.5   CHLORIDE mmol/L 101 104 105 100   CO2 mmol/L 32 28 27 33*   BUN mg/dL 23 23 23 21   CREATININE mg/dL 0.70 0.65 0.78 0.79   CALCIUM mg/dL 9.1 9.2 9.0 9.6   ALK PHOS U/L  --   --  55 54   ALT U/L  --   --  52 40   AST U/L  --   --  33 31       Time spent 35 minutes in reviewing earlier inpatient hospitalization notes, reviewing and interpreting labs, reviewing chest x-ray report, reviewing interpreting EKG, echocardiogram, discussion and educating patient, documentation.           ** Please Note: Fluency DirectDictation voice to text software may have been used in the creation of this document. **

## 2025-02-22 NOTE — ASSESSMENT & PLAN NOTE
New onset of cardiomyopathy.  Have started him today on Coreg 6.25 mg twice daily, the dose of the Coreg can be increased depending upon the blood pressure response.    -After the cardiac catheterization has been done, we will consider the ACE/ARB inhibitors

## 2025-02-22 NOTE — PLAN OF CARE
Problem: Potential for Falls  Goal: Patient will remain free of falls  Description: INTERVENTIONS:  - Educate patient/family on patient safety including physical limitations  - Instruct patient to call for assistance with activity   - Consult OT/PT to assist with strengthening/mobility   - Keep Call bell within reach  - Keep bed low and locked with side rails adjusted as appropriate  - Keep care items and personal belongings within reach  - Initiate and maintain comfort rounds  - Make Fall Risk Sign visible to staff  - Offer Toileting every 2 Hours, in advance of need  - Initiate/Maintain bed alarm  - Obtain necessary fall risk management equipment: chair alarm  - Apply yellow socks and bracelet for high fall risk patients  - Consider moving patient to room near nurses station  Outcome: Progressing     Problem: PAIN - ADULT  Goal: Verbalizes/displays adequate comfort level or baseline comfort level  Description: Interventions:  - Encourage patient to monitor pain and request assistance  - Assess pain using appropriate pain scale  - Administer analgesics based on type and severity of pain and evaluate response  - Implement non-pharmacological measures as appropriate and evaluate response  - Consider cultural and social influences on pain and pain management  - Notify physician/advanced practitioner if interventions unsuccessful or patient reports new pain  Outcome: Progressing     Problem: INFECTION - ADULT  Goal: Absence or prevention of progression during hospitalization  Description: INTERVENTIONS:  - Assess and monitor for signs and symptoms of infection  - Monitor lab/diagnostic results  - Monitor all insertion sites, i.e. indwelling lines, tubes, and drains  - Monitor endotracheal if appropriate and nasal secretions for changes in amount and color  - [place] appropriate cooling/warming therapies per order  - Administer medications as ordered  - Instruct and encourage patient and family to use good hand  hygiene technique  - Identify and instruct in appropriate isolation precautions for identified infection/condition  Outcome: Progressing  Goal: Absence of fever/infection during neutropenic period  Description: INTERVENTIONS:  - Monitor WBC    Outcome: Progressing     Problem: SAFETY ADULT  Goal: Patient will remain free of falls  Description: INTERVENTIONS:  - Educate patient/family on patient safety including physical limitations  - Instruct patient to call for assistance with activity   - Consult OT/PT to assist with strengthening/mobility   - Keep Call bell within reach  - Keep bed low and locked with side rails adjusted as appropriate  - Keep care items and personal belongings within reach  - Initiate and maintain comfort rounds  - Make Fall Risk Sign visible to staff  - Offer Toileting every 2 Hours, in advance of need  - Initiate/Maintain bed alarm  - Obtain necessary fall risk management equipment: chair alarm  - Apply yellow socks and bracelet for high fall risk patients  - Consider moving patient to room near nurses station  Outcome: Progressing  Goal: Maintain or return to baseline ADL function  Description: INTERVENTIONS:  -  Assess patient's ability to carry out ADLs; assess patient's baseline for ADL function and identify physical deficits which impact ability to perform ADLs (bathing, care of mouth/teeth, toileting, grooming, dressing, etc.)  - Assess/evaluate cause of self-care deficits   - Assess range of motion  - Assess patient's mobility; develop plan if impaired  - Assess patient's need for assistive devices and provide as appropriate  - Encourage maximum independence but intervene and supervise when necessary  - Involve family in performance of ADLs  - Assess for home care needs following discharge   - Consider OT consult to assist with ADL evaluation and planning for discharge  - Provide patient education as appropriate  Outcome: Progressing  Goal: Maintains/Returns to pre admission functional  level  Description: INTERVENTIONS:  - Perform AM-PAC 6 Click Basic Mobility/ Daily Activity assessment daily.  - Set and communicate daily mobility goal to care team and patient/family/caregiver.   - Collaborate with rehabilitation services on mobility goals if consulted  - Perform Range of Motion 3 times a day.  - Reposition patient every 3 hours.  - Dangle patient 3 times a day  - Stand patient 3 times a day  - Ambulate patient 3 times a day  - Out of bed to chair 3 times a day   - Out of bed for meals 3 times a day  - Out of bed for toileting  - Record patient progress and toleration of activity level   Outcome: Progressing     Problem: DISCHARGE PLANNING  Goal: Discharge to home or other facility with appropriate resources  Description: INTERVENTIONS:  - Identify barriers to discharge w/patient and caregiver  - Arrange for needed discharge resources and transportation as appropriate  - Identify discharge learning needs (meds, wound care, etc.)  - Arrange for interpretive services to assist at discharge as needed  - Refer to Case Management Department for coordinating discharge planning if the patient needs post-hospital services based on physician/advanced practitioner order or complex needs related to functional status, cognitive ability, or social support system  Outcome: Progressing     Problem: Knowledge Deficit  Goal: Patient/family/caregiver demonstrates understanding of disease process, treatment plan, medications, and discharge instructions  Description: Complete learning assessment and assess knowledge base.  Interventions:  - Provide teaching at level of understanding  - Provide teaching via preferred learning methods  Outcome: Progressing     Problem: RESPIRATORY - ADULT  Goal: Achieves optimal ventilation and oxygenation  Description: INTERVENTIONS:  - Assess for changes in respiratory status  - Assess for changes in mentation and behavior  - Position to facilitate oxygenation and minimize  respiratory effort  - Oxygen administered by appropriate delivery if ordered  - Initiate smoking cessation education as indicated  - Encourage broncho-pulmonary hygiene including cough, deep breathe, Incentive Spirometry  - Assess the need for suctioning and aspirate as needed  - Assess and instruct to report SOB or any respiratory difficulty  - Respiratory Therapy support as indicated  Outcome: Progressing     Problem: CARDIOVASCULAR - ADULT  Goal: Maintains optimal cardiac output and hemodynamic stability  Description: INTERVENTIONS:  - Monitor I/O, vital signs and rhythm  - Monitor for S/S and trends of decreased cardiac output  - Administer and titrate ordered vasoactive medications to optimize hemodynamic stability  - Assess quality of pulses, skin color and temperature  - Assess for signs of decreased coronary artery perfusion  - Instruct patient to report change in severity of symptoms  Outcome: Progressing  Goal: Absence of cardiac dysrhythmias or at baseline rhythm  Description: INTERVENTIONS:  - Continuous cardiac monitoring, vital signs, obtain 12 lead EKG if ordered  - Administer antiarrhythmic and heart rate control medications as ordered  - Monitor electrolytes and administer replacement therapy as ordered  Outcome: Progressing

## 2025-02-22 NOTE — ASSESSMENT & PLAN NOTE
Mildly elevated troponin, no chest pain.  Appreciate cardiology evaluation  Plan as outlined under cardiomyopathy

## 2025-02-23 LAB
ANION GAP SERPL CALCULATED.3IONS-SCNC: 7 MMOL/L (ref 4–13)
BASOPHILS # BLD AUTO: 0.03 THOUSANDS/ÂΜL (ref 0–0.1)
BASOPHILS NFR BLD AUTO: 0 % (ref 0–1)
BUN SERPL-MCNC: 28 MG/DL (ref 5–25)
CALCIUM SERPL-MCNC: 9 MG/DL (ref 8.4–10.2)
CHLORIDE SERPL-SCNC: 101 MMOL/L (ref 96–108)
CO2 SERPL-SCNC: 30 MMOL/L (ref 21–32)
CREAT SERPL-MCNC: 0.75 MG/DL (ref 0.6–1.3)
EOSINOPHIL # BLD AUTO: 0 THOUSAND/ÂΜL (ref 0–0.61)
EOSINOPHIL NFR BLD AUTO: 0 % (ref 0–6)
ERYTHROCYTE [DISTWIDTH] IN BLOOD BY AUTOMATED COUNT: 14.4 % (ref 11.6–15.1)
GFR SERPL CREATININE-BSD FRML MDRD: 96 ML/MIN/1.73SQ M
GLUCOSE SERPL-MCNC: 147 MG/DL (ref 65–140)
HCT VFR BLD AUTO: 45.5 % (ref 36.5–49.3)
HGB BLD-MCNC: 15.1 G/DL (ref 12–17)
IMM GRANULOCYTES # BLD AUTO: 0.25 THOUSAND/UL (ref 0–0.2)
IMM GRANULOCYTES NFR BLD AUTO: 1 % (ref 0–2)
LYMPHOCYTES # BLD AUTO: 2.13 THOUSANDS/ÂΜL (ref 0.6–4.47)
LYMPHOCYTES NFR BLD AUTO: 12 % (ref 14–44)
MCH RBC QN AUTO: 29.2 PG (ref 26.8–34.3)
MCHC RBC AUTO-ENTMCNC: 33.2 G/DL (ref 31.4–37.4)
MCV RBC AUTO: 88 FL (ref 82–98)
MONOCYTES # BLD AUTO: 0.88 THOUSAND/ÂΜL (ref 0.17–1.22)
MONOCYTES NFR BLD AUTO: 5 % (ref 4–12)
NEUTROPHILS # BLD AUTO: 14.35 THOUSANDS/ÂΜL (ref 1.85–7.62)
NEUTS SEG NFR BLD AUTO: 82 % (ref 43–75)
NRBC BLD AUTO-RTO: 0 /100 WBCS
PLATELET # BLD AUTO: 197 THOUSANDS/UL (ref 149–390)
PMV BLD AUTO: 9.2 FL (ref 8.9–12.7)
POTASSIUM SERPL-SCNC: 4.5 MMOL/L (ref 3.5–5.3)
RBC # BLD AUTO: 5.17 MILLION/UL (ref 3.88–5.62)
SODIUM SERPL-SCNC: 138 MMOL/L (ref 135–147)
WBC # BLD AUTO: 17.64 THOUSAND/UL (ref 4.31–10.16)

## 2025-02-23 PROCEDURE — 94760 N-INVAS EAR/PLS OXIMETRY 1: CPT

## 2025-02-23 PROCEDURE — 99232 SBSQ HOSP IP/OBS MODERATE 35: CPT | Performed by: INTERNAL MEDICINE

## 2025-02-23 PROCEDURE — 85025 COMPLETE CBC W/AUTO DIFF WBC: CPT | Performed by: INTERNAL MEDICINE

## 2025-02-23 PROCEDURE — 80048 BASIC METABOLIC PNL TOTAL CA: CPT | Performed by: INTERNAL MEDICINE

## 2025-02-23 PROCEDURE — 94640 AIRWAY INHALATION TREATMENT: CPT

## 2025-02-23 PROCEDURE — 94664 DEMO&/EVAL PT USE INHALER: CPT

## 2025-02-23 RX ORDER — SODIUM CHLORIDE 9 MG/ML
75 INJECTION, SOLUTION INTRAVENOUS CONTINUOUS
Status: DISCONTINUED | OUTPATIENT
Start: 2025-02-23 | End: 2025-02-23

## 2025-02-23 RX ORDER — SODIUM CHLORIDE 9 MG/ML
75 INJECTION, SOLUTION INTRAVENOUS CONTINUOUS
Status: DISPENSED | OUTPATIENT
Start: 2025-02-24 | End: 2025-02-24

## 2025-02-23 RX ADMIN — CARVEDILOL 6.25 MG: 6.25 TABLET, FILM COATED ORAL at 17:17

## 2025-02-23 RX ADMIN — LEVALBUTEROL HYDROCHLORIDE 1.25 MG: 1.25 SOLUTION RESPIRATORY (INHALATION) at 19:52

## 2025-02-23 RX ADMIN — BUDESONIDE 0.5 MG: 0.5 INHALANT ORAL at 07:28

## 2025-02-23 RX ADMIN — ASPIRIN 81 MG 81 MG: 81 TABLET ORAL at 08:21

## 2025-02-23 RX ADMIN — ENOXAPARIN SODIUM 40 MG: 40 INJECTION SUBCUTANEOUS at 08:21

## 2025-02-23 RX ADMIN — LEVALBUTEROL HYDROCHLORIDE 1.25 MG: 1.25 SOLUTION RESPIRATORY (INHALATION) at 07:28

## 2025-02-23 RX ADMIN — METHYLPREDNISOLONE SODIUM SUCCINATE 40 MG: 40 INJECTION, POWDER, FOR SOLUTION INTRAMUSCULAR; INTRAVENOUS at 22:16

## 2025-02-23 RX ADMIN — BUDESONIDE 0.5 MG: 0.5 INHALANT ORAL at 19:52

## 2025-02-23 RX ADMIN — CARVEDILOL 6.25 MG: 6.25 TABLET, FILM COATED ORAL at 08:21

## 2025-02-23 RX ADMIN — GUAIFENESIN 600 MG: 600 TABLET, EXTENDED RELEASE ORAL at 17:17

## 2025-02-23 RX ADMIN — DOXYCYCLINE HYCLATE 100 MG: 100 CAPSULE ORAL at 11:53

## 2025-02-23 RX ADMIN — GUAIFENESIN 600 MG: 600 TABLET, EXTENDED RELEASE ORAL at 08:21

## 2025-02-23 RX ADMIN — LEVALBUTEROL HYDROCHLORIDE 1.25 MG: 1.25 SOLUTION RESPIRATORY (INHALATION) at 13:33

## 2025-02-23 RX ADMIN — METHYLPREDNISOLONE SODIUM SUCCINATE 40 MG: 40 INJECTION, POWDER, FOR SOLUTION INTRAMUSCULAR; INTRAVENOUS at 08:21

## 2025-02-23 NOTE — PLAN OF CARE
Problem: Potential for Falls  Goal: Patient will remain free of falls  Description: INTERVENTIONS:  - Educate patient/family on patient safety including physical limitations  - Instruct patient to call for assistance with activity   - Consult OT/PT to assist with strengthening/mobility   - Keep Call bell within reach  - Keep bed low and locked with side rails adjusted as appropriate  - Keep care items and personal belongings within reach  - Initiate and maintain comfort rounds  - Make Fall Risk Sign visible to staff  - Offer Toileting every 2 Hours, in advance of need  - Initiate/Maintain bed alarm  - Obtain necessary fall risk management equipment: chair alarm  - Apply yellow socks and bracelet for high fall risk patients  - Consider moving patient to room near nurses station  Outcome: Progressing     Problem: PAIN - ADULT  Goal: Verbalizes/displays adequate comfort level or baseline comfort level  Description: Interventions:  - Encourage patient to monitor pain and request assistance  - Assess pain using appropriate pain scale  - Administer analgesics based on type and severity of pain and evaluate response  - Implement non-pharmacological measures as appropriate and evaluate response  - Consider cultural and social influences on pain and pain management  - Notify physician/advanced practitioner if interventions unsuccessful or patient reports new pain  Outcome: Progressing     Problem: INFECTION - ADULT  Goal: Absence or prevention of progression during hospitalization  Description: INTERVENTIONS:  - Assess and monitor for signs and symptoms of infection  - Monitor lab/diagnostic results  - Monitor all insertion sites, i.e. indwelling lines, tubes, and drains  - Monitor endotracheal if appropriate and nasal secretions for changes in amount and color  - [place] appropriate cooling/warming therapies per order  - Administer medications as ordered  - Instruct and encourage patient and family to use good hand  hygiene technique  - Identify and instruct in appropriate isolation precautions for identified infection/condition  Outcome: Progressing  Goal: Absence of fever/infection during neutropenic period  Description: INTERVENTIONS:  - Monitor WBC    Outcome: Progressing     Problem: SAFETY ADULT  Goal: Patient will remain free of falls  Description: INTERVENTIONS:  - Educate patient/family on patient safety including physical limitations  - Instruct patient to call for assistance with activity   - Consult OT/PT to assist with strengthening/mobility   - Keep Call bell within reach  - Keep bed low and locked with side rails adjusted as appropriate  - Keep care items and personal belongings within reach  - Initiate and maintain comfort rounds  - Make Fall Risk Sign visible to staff  - Offer Toileting every 2 Hours, in advance of need  - Initiate/Maintain bed alarm  - Obtain necessary fall risk management equipment: chair alarm  - Apply yellow socks and bracelet for high fall risk patients  - Consider moving patient to room near nurses station  Outcome: Progressing  Goal: Maintain or return to baseline ADL function  Description: INTERVENTIONS:  -  Assess patient's ability to carry out ADLs; assess patient's baseline for ADL function and identify physical deficits which impact ability to perform ADLs (bathing, care of mouth/teeth, toileting, grooming, dressing, etc.)  - Assess/evaluate cause of self-care deficits   - Assess range of motion  - Assess patient's mobility; develop plan if impaired  - Assess patient's need for assistive devices and provide as appropriate  - Encourage maximum independence but intervene and supervise when necessary  - Involve family in performance of ADLs  - Assess for home care needs following discharge   - Consider OT consult to assist with ADL evaluation and planning for discharge  - Provide patient education as appropriate  Outcome: Progressing  Goal: Maintains/Returns to pre admission functional  level  Description: INTERVENTIONS:  - Perform AM-PAC 6 Click Basic Mobility/ Daily Activity assessment daily.  - Set and communicate daily mobility goal to care team and patient/family/caregiver.   - Collaborate with rehabilitation services on mobility goals if consulted  - Perform Range of Motion 3 times a day.  - Reposition patient every 3 hours.  - Dangle patient 3 times a day  - Stand patient 3 times a day  - Ambulate patient 3 times a day  - Out of bed to chair 3 times a day   - Out of bed for meals 3 times a day  - Out of bed for toileting  - Record patient progress and toleration of activity level   Outcome: Progressing     Problem: DISCHARGE PLANNING  Goal: Discharge to home or other facility with appropriate resources  Description: INTERVENTIONS:  - Identify barriers to discharge w/patient and caregiver  - Arrange for needed discharge resources and transportation as appropriate  - Identify discharge learning needs (meds, wound care, etc.)  - Arrange for interpretive services to assist at discharge as needed  - Refer to Case Management Department for coordinating discharge planning if the patient needs post-hospital services based on physician/advanced practitioner order or complex needs related to functional status, cognitive ability, or social support system  Outcome: Progressing     Problem: Knowledge Deficit  Goal: Patient/family/caregiver demonstrates understanding of disease process, treatment plan, medications, and discharge instructions  Description: Complete learning assessment and assess knowledge base.  Interventions:  - Provide teaching at level of understanding  - Provide teaching via preferred learning methods  Outcome: Progressing     Problem: RESPIRATORY - ADULT  Goal: Achieves optimal ventilation and oxygenation  Description: INTERVENTIONS:  - Assess for changes in respiratory status  - Assess for changes in mentation and behavior  - Position to facilitate oxygenation and minimize  respiratory effort  - Oxygen administered by appropriate delivery if ordered  - Initiate smoking cessation education as indicated  - Encourage broncho-pulmonary hygiene including cough, deep breathe, Incentive Spirometry  - Assess the need for suctioning and aspirate as needed  - Assess and instruct to report SOB or any respiratory difficulty  - Respiratory Therapy support as indicated  Outcome: Progressing     Problem: CARDIOVASCULAR - ADULT  Goal: Maintains optimal cardiac output and hemodynamic stability  Description: INTERVENTIONS:  - Monitor I/O, vital signs and rhythm  - Monitor for S/S and trends of decreased cardiac output  - Administer and titrate ordered vasoactive medications to optimize hemodynamic stability  - Assess quality of pulses, skin color and temperature  - Assess for signs of decreased coronary artery perfusion  - Instruct patient to report change in severity of symptoms  Outcome: Progressing  Goal: Absence of cardiac dysrhythmias or at baseline rhythm  Description: INTERVENTIONS:  - Continuous cardiac monitoring, vital signs, obtain 12 lead EKG if ordered  - Administer antiarrhythmic and heart rate control medications as ordered  - Monitor electrolytes and administer replacement therapy as ordered  Outcome: Progressing

## 2025-02-23 NOTE — PROGRESS NOTES
Progress Note - Hospitalist   Name: Armando Archer 65 y.o. male I MRN: [id number]  Unit/Bed#: -01 I Date of Admission: 2/19/2025   Date of Service: 2/23/2025 I Hospital Day: 2    Assessment & Plan  COPD with acute exacerbation (HCC)  Recently on steroids and Levaquin  [facility]  Will do doxycycline 100 mg twice daily  Clinically improving on IV steroids 40 every 12 hours  If continues to improve can likely be transition to p.o. steroids tomorrow    Elevated troponin  Mildly elevated troponin, no chest pain.  Appreciate cardiology evaluation  Plan as outlined under cardiomyopathy  Morbid obesity due to excess calories (HCC)  As noted by BMI of 44  Diet exercise counseling provided  Cardiomyopathy (HCC)  Noted to have a EF of 40% new onset  Planning for cardiac cath tomorrow  N.p.o. after midnight    VTE Pharmacologic Prophylaxis: VTE Score: 3 Moderate Risk (Score 3-4) - Pharmacological DVT Prophylaxis Ordered: enoxaparin (Lovenox).    Mobility:   Basic Mobility Inpatient Raw Score: 24  JH-HLM Goal: 8: Walk 250 feet or more  JH-HLM Achieved: 8: Walk 250 feet ot more  JH-HLM Goal achieved. Continue to encourage appropriate mobility.    Patient Centered Rounds: I performed bedside rounds with nursing staff today.   Discussions with Specialists or Other Care Team Provider: cm, nursing    Education and Discussions with Family / Patient:  pt, wife.     Current Length of Stay: 2 day(s)  Current Patient Status: Inpatient   Certification Statement: The patient will continue to require additional inpatient hospital stay due to see below  Discharge Plan:  Pending cardiac catheterization tomorrow    Code Status: Level 1 - Full Code    Subjective   Denies chest pain, shortness of breath and cough with fevers, chills    Objective :  Temp:  [97.2 °F (36.2 °C)-97.7 °F (36.5 °C)] 97.5 °F (36.4 °C)  HR:  [] 79  BP: (133-162)/(73-91) 157/91  Resp:  [16-19] 16  SpO2:  [91 %-94 %] 94 %  O2 Device: None  (Room air)    Body mass index is 44.2 kg/m².     Input and Output Summary (last 24 hours):     Intake/Output Summary (Last 24 hours) at 2/23/2025 1032  Last data filed at 2/23/2025 0801  Gross per 24 hour   Intake 720 ml   Output --   Net 720 ml       Physical Exam  Constitutional:       General: He is not in acute distress.     Appearance: He is well-developed. He is not diaphoretic.   HENT:      Head: Normocephalic and atraumatic.      Nose: Nose normal.      Mouth/Throat:      Pharynx: No oropharyngeal exudate.   Eyes:      General: No scleral icterus.     Conjunctiva/sclera: Conjunctivae normal.   Cardiovascular:      Rate and Rhythm: Normal rate and regular rhythm.      Heart sounds: Normal heart sounds. No murmur heard.     No friction rub. No gallop.   Pulmonary:      Effort: Pulmonary effort is normal. No respiratory distress.      Breath sounds: Normal breath sounds. No wheezing or rales.   Chest:      Chest wall: No tenderness.   Abdominal:      General: Bowel sounds are normal. There is no distension.      Palpations: Abdomen is soft.      Tenderness: There is no abdominal tenderness. There is no guarding.   Musculoskeletal:         General: No tenderness or deformity. Normal range of motion.      Cervical back: Normal range of motion and neck supple.   Skin:     General: Skin is warm and dry.      Findings: No erythema.   Neurological:      Mental Status: He is alert. Mental status is at baseline.           Lines/Drains:              Lab Results: I have reviewed the following results:   Results from last 7 days   Lab Units 02/23/25  0517   WBC Thousand/uL 17.64*   HEMOGLOBIN g/dL 15.1   HEMATOCRIT % 45.5   PLATELETS Thousands/uL 197   SEGS PCT % 82*   LYMPHO PCT % 12*   MONO PCT % 5   EOS PCT % 0     Results from last 7 days   Lab Units 02/23/25  0517 02/20/25  0454 02/19/25  1655   SODIUM mmol/L 138   < > 139   POTASSIUM mmol/L 4.5   < > 4.1   CHLORIDE mmol/L 101   < > 105   CO2 mmol/L 30   < > 27   BUN  mg/dL 28*   < > 23   CREATININE mg/dL 0.75   < > 0.78   ANION GAP mmol/L 7   < > 7   CALCIUM mg/dL 9.0   < > 9.0   ALBUMIN g/dL  --   --  4.2   TOTAL BILIRUBIN mg/dL  --   --  0.37   ALK PHOS U/L  --   --  55   ALT U/L  --   --  52   AST U/L  --   --  33   GLUCOSE RANDOM mg/dL 147*   < > 145*    < > = values in this interval not displayed.                       Recent Cultures (last 7 days):         Imaging Results Review: I personally reviewed the following image studies/reports in PACS and discussed pertinent findings with Radiology: chest xray. My interpretation of the radiology images/reports is:  .  Other Study Results Review: EKG was reviewed.     Last 24 Hours Medication List:     Current Facility-Administered Medications:     acetaminophen (TYLENOL) tablet 650 mg, Q6H PRN    aspirin chewable tablet 81 mg, Daily    budesonide (PULMICORT) inhalation solution 0.5 mg, Q12H    carvedilol (COREG) tablet 6.25 mg, BID With Meals    doxycycline hyclate (VIBRAMYCIN) capsule 100 mg, Q12H    enoxaparin (LOVENOX) subcutaneous injection 40 mg, Q12H    guaiFENesin (MUCINEX) 12 hr tablet 600 mg, BID    levalbuterol (XOPENEX) inhalation solution 1.25 mg, TID **AND** [DISCONTINUED] sodium chloride 0.9 % inhalation solution 3 mL, TID    methylPREDNISolone sodium succinate (Solu-MEDROL) injection 40 mg, Q12H MEREDITH    Administrative Statements   Today, Patient Was Seen By: Francesco Holder MD  I have spent a total time of 30 minutes in caring for this patient on the day of the visit/encounter including Diagnostic results.  I set up    **Please Note: This note may have been constructed using a voice recognition system.**

## 2025-02-23 NOTE — PROGRESS NOTES
Cardiology Progress Note - Armando Archer 65 y.o. male MRN: [id number]    Unit/Bed#: -01 Encounter: [id number]      Assessment/Plan: Armando Archer admitted with shortness of breath, was found to have COPD exacerbation.  He also was found to have elevated troponins.  The repeat echocardiogram shows evidence of diminished left ventricular systolic function which is a new onset of cardiomyopathy.  Taking these findings into account he will be considered for right and left heart cardiac catheterization tomorrow morning.    -After the cardiac catheterization has been performed depending upon the hemodynamics, he will be considered for the ACE/ARB elevators taking the cardiomyopathy findings into account.  Holding of the ACE inhibitors for now to avoid the contrast-induced nephropathy.      Assessment & Plan  Elevated troponin  Echocardiogram that was done yesterday showed evidence of ejection fraction of approximately 40%.  This is a new onset of cardiomyopathy.  He also was found to have the elevated troponins.  -With a new onset of cardiomyopathy and COPD he will be scheduled for right and left heart cardiac catheterization tomorrow morning.  I have also have explained to the patient at great length about the indications for the procedure and he has agreed to proceed ahead.  COPD with acute exacerbation (HCC)  As per internal medicine team  Morbid obesity due to excess calories (HCC)  Lifestyle modifications are important.  Cardiomyopathy (HCC)  New onset of cardiomyopathy.  Have started him today on Coreg 6.25 mg twice daily, the dose of the Coreg can be increased depending upon the blood pressure response.    -After the cardiac catheterization has been done, we will consider the ACE/ARB inhibitors      Subjective:   Patient seen and examined.  He is resting comfortably in the chair.  No evidence of having any chest tightness.  Breathing appears to be much better than the previous evaluations.  No overnight hemodynamic  "disturbances noted.    Objective:     Vitals: Blood pressure 157/91, pulse 79, temperature 97.5 °F (36.4 °C), resp. rate 16, height 6' 1\" (1.854 m), weight (!) 152 kg (335 lb), SpO2 94%., Body mass index is 44.2 kg/m².,   Orthostatic Blood Pressures      Flowsheet Row Most Recent Value   Blood Pressure 157/91 filed at 02/23/2025 0714   Patient Position - Orthostatic VS Lying filed at 02/22/2025 2300              Intake/Output Summary (Last 24 hours) at 2/23/2025 1025  Last data filed at 2/23/2025 0801  Gross per 24 hour   Intake 720 ml   Output --   Net 720 ml         Physical Exam  Constitutional:       Appearance: Normal appearance.   Cardiovascular:      Rate and Rhythm: Regular rhythm.      Pulses: Normal pulses.      Heart sounds: No murmur heard.  Pulmonary:      Effort: Pulmonary effort is normal.      Breath sounds: Normal breath sounds. No wheezing.   Chest:      Chest wall: No tenderness.   Musculoskeletal:      Cervical back: Neck supple.   Skin:     General: Skin is warm.   Neurological:      General: No focal deficit present.      Mental Status: He is alert.         Telemetry:    Telemetry Reviewed: Normal Sinus Rhythm  Indication for Continued Telemetry Use: Acute respiratory failure on Bipap    Medications:      Current Facility-Administered Medications:     acetaminophen (TYLENOL) tablet 650 mg, 650 mg, Oral, Q6H PRN, Rolf Perez MD    aspirin chewable tablet 81 mg, 81 mg, Oral, Daily, Rolf Perez MD, 81 mg at 02/23/25 0821    budesonide (PULMICORT) inhalation solution 0.5 mg, 0.5 mg, Nebulization, Q12H, Francesco Holder MD, 0.5 mg at 02/23/25 0728    carvedilol (COREG) tablet 6.25 mg, 6.25 mg, Oral, BID With Meals, Jase Godinez MD, 6.25 mg at 02/23/25 0821    doxycycline hyclate (VIBRAMYCIN) capsule 100 mg, 100 mg, Oral, Q12H, Rolf Perez MD, 100 mg at 02/22/25 2300    enoxaparin (LOVENOX) subcutaneous injection 40 mg, 40 mg, Subcutaneous, Q12H, Rolf Perez MD, 40 " mg at 02/23/25 0821    guaiFENesin (MUCINEX) 12 hr tablet 600 mg, 600 mg, Oral, BID, Rolf Perez MD, 600 mg at 02/23/25 0821    levalbuterol (XOPENEX) inhalation solution 1.25 mg, 1.25 mg, Nebulization, TID, 1.25 mg at 02/23/25 0728 **AND** [DISCONTINUED] sodium chloride 0.9 % inhalation solution 3 mL, 3 mL, Nebulization, TID, Rolf Perez MD    methylPREDNISolone sodium succinate (Solu-MEDROL) injection 40 mg, 40 mg, Intravenous, Q12H MEREDITH, Francesco Holder MD, 40 mg at 02/23/25 0821     Labs & Results:    Results from last 7 days   Lab Units 02/19/25  2105 02/19/25  1900 02/19/25  1655   HS TNI 0HR ng/L  --   --  52*   HS TNI 2HR ng/L  --  59*  --    HS TNI 4HR ng/L 64*  --   --    HSTNI D4 ng/L 12  --   --      Results from last 7 days   Lab Units 02/23/25  0517 02/22/25  0627 02/20/25  0454   WBC Thousand/uL 17.64* 15.53* 10.33*   HEMOGLOBIN g/dL 15.1 14.7 14.5   HEMATOCRIT % 45.5 44.7 44.9   PLATELETS Thousands/uL 197 175 160         Results from last 7 days   Lab Units 02/23/25  0517 02/22/25  0627 02/20/25  0454 02/19/25  1655 02/19/25  1655 02/17/25  0858   POTASSIUM mmol/L 4.5 4.6 4.5   < > 4.1 4.5   CHLORIDE mmol/L 101 101 104   < > 105 100   CO2 mmol/L 30 32 28   < > 27 33*   BUN mg/dL 28* 23 23   < > 23 21   CREATININE mg/dL 0.75 0.70 0.65   < > 0.78 0.79   CALCIUM mg/dL 9.0 9.1 9.2   < > 9.0 9.6   ALK PHOS U/L  --   --   --   --  55 54   ALT U/L  --   --   --   --  52 40   AST U/L  --   --   --   --  33 31    < > = values in this interval not displayed.       Time spent 35 minutes in r, earlier inpatient hospitalization notes, reviewing and interpreting labs, reviewing chest x-ray report, reviewing interpreting telemetry, indications of the cardiac catheterization.  And educating patient, documentation.           ** Please Note: Fluency DirectDictation voice to text software may have been used in the creation of this document. **

## 2025-02-23 NOTE — ASSESSMENT & PLAN NOTE
Echocardiogram that was done yesterday showed evidence of ejection fraction of approximately 40%.  This is a new onset of cardiomyopathy.  He also was found to have the elevated troponins.  -With a new onset of cardiomyopathy and COPD he will be scheduled for right and left heart cardiac catheterization tomorrow morning.  I have also have explained to the patient at great length about the indications for the procedure and he has agreed to proceed ahead.

## 2025-02-23 NOTE — ASSESSMENT & PLAN NOTE
Recently on steroids and Levaquin  Patient followed by [facility] pulmonology  Will do doxycycline 100 mg twice daily  Clinically improving on IV steroids 40 every 12 hours  If continues to improve can likely be transition to p.o. steroids tomorrow

## 2025-02-23 NOTE — PLAN OF CARE
Problem: Potential for Falls  Goal: Patient will remain free of falls  Description: INTERVENTIONS:  - Educate patient/family on patient safety including physical limitations  - Instruct patient to call for assistance with activity   - Consult OT/PT to assist with strengthening/mobility   - Keep Call bell within reach  - Keep bed low and locked with side rails adjusted as appropriate  - Keep care items and personal belongings within reach  - Initiate and maintain comfort rounds  - Make Fall Risk Sign visible to staff  - Offer Toileting every  Hours, in advance of need  - Initiate/Maintain alarm  - Obtain necessary fall risk management equipment:   - Apply yellow socks and bracelet for high fall risk patients  - Consider moving patient to room near nurses station  Outcome: Progressing     Problem: PAIN - ADULT  Goal: Verbalizes/displays adequate comfort level or baseline comfort level  Description: Interventions:  - Encourage patient to monitor pain and request assistance  - Assess pain using appropriate pain scale  - Administer analgesics based on type and severity of pain and evaluate response  - Implement non-pharmacological measures as appropriate and evaluate response  - Consider cultural and social influences on pain and pain management  - Notify physician/advanced practitioner if interventions unsuccessful or patient reports new pain  Outcome: Progressing     Problem: INFECTION - ADULT  Goal: Absence or prevention of progression during hospitalization  Description: INTERVENTIONS:  - Assess and monitor for signs and symptoms of infection  - Monitor lab/diagnostic results  - Monitor all insertion sites, i.e. indwelling lines, tubes, and drains  - Monitor endotracheal if appropriate and nasal secretions for changes in amount and color  - Shepherd appropriate cooling/warming therapies per order  - Administer medications as ordered  - Instruct and encourage patient and family to use good hand hygiene technique  -  Identify and instruct in appropriate isolation precautions for identified infection/condition  Outcome: Progressing  Goal: Absence of fever/infection during neutropenic period  Description: INTERVENTIONS:  - Monitor WBC    Outcome: Progressing     Problem: SAFETY ADULT  Goal: Patient will remain free of falls  Description: INTERVENTIONS:  - Educate patient/family on patient safety including physical limitations  - Instruct patient to call for assistance with activity   - Consult OT/PT to assist with strengthening/mobility   - Keep Call bell within reach  - Keep bed low and locked with side rails adjusted as appropriate  - Keep care items and personal belongings within reach  - Initiate and maintain comfort rounds  - Make Fall Risk Sign visible to staff  - Offer Toileting every  Hours, in advance of need  - Initiate/Maintain alarm  - Obtain necessary fall risk management equipment:   - Apply yellow socks and bracelet for high fall risk patients  - Consider moving patient to room near nurses station  Outcome: Progressing  Goal: Maintain or return to baseline ADL function  Description: INTERVENTIONS:  -  Assess patient's ability to carry out ADLs; assess patient's baseline for ADL function and identify physical deficits which impact ability to perform ADLs (bathing, care of mouth/teeth, toileting, grooming, dressing, etc.)  - Assess/evaluate cause of self-care deficits   - Assess range of motion  - Assess patient's mobility; develop plan if impaired  - Assess patient's need for assistive devices and provide as appropriate  - Encourage maximum independence but intervene and supervise when necessary  - Involve family in performance of ADLs  - Assess for home care needs following discharge   - Consider OT consult to assist with ADL evaluation and planning for discharge  - Provide patient education as appropriate  Outcome: Progressing  Goal: Maintains/Returns to pre admission functional level  Description:  INTERVENTIONS:  - Perform AM-PAC 6 Click Basic Mobility/ Daily Activity assessment daily.  - Set and communicate daily mobility goal to care team and patient/family/caregiver.   - Collaborate with rehabilitation services on mobility goals if consulted  - Perform Range of Motion  times a day.  - Reposition patient every  hours.  - Dangle patient  times a day  - Stand patient  times a day  - Ambulate patient  times a day  - Out of bed to chair  times a day   - Out of bed for meals  times a day  - Out of bed for toileting  - Record patient progress and toleration of activity level   Outcome: Progressing     Problem: DISCHARGE PLANNING  Goal: Discharge to home or other facility with appropriate resources  Description: INTERVENTIONS:  - Identify barriers to discharge w/patient and caregiver  - Arrange for needed discharge resources and transportation as appropriate  - Identify discharge learning needs (meds, wound care, etc.)  - Arrange for interpretive services to assist at discharge as needed  - Refer to Case Management Department for coordinating discharge planning if the patient needs post-hospital services based on physician/advanced practitioner order or complex needs related to functional status, cognitive ability, or social support system  Outcome: Progressing     Problem: Knowledge Deficit  Goal: Patient/family/caregiver demonstrates understanding of disease process, treatment plan, medications, and discharge instructions  Description: Complete learning assessment and assess knowledge base.  Interventions:  - Provide teaching at level of understanding  - Provide teaching via preferred learning methods  Outcome: Progressing     Problem: RESPIRATORY - ADULT  Goal: Achieves optimal ventilation and oxygenation  Description: INTERVENTIONS:  - Assess for changes in respiratory status  - Assess for changes in mentation and behavior  - Position to facilitate oxygenation and minimize respiratory effort  - Oxygen  administered by appropriate delivery if ordered  - Initiate smoking cessation education as indicated  - Encourage broncho-pulmonary hygiene including cough, deep breathe, Incentive Spirometry  - Assess the need for suctioning and aspirate as needed  - Assess and instruct to report SOB or any respiratory difficulty  - Respiratory Therapy support as indicated  Outcome: Progressing     Problem: CARDIOVASCULAR - ADULT  Goal: Maintains optimal cardiac output and hemodynamic stability  Description: INTERVENTIONS:  - Monitor I/O, vital signs and rhythm  - Monitor for S/S and trends of decreased cardiac output  - Administer and titrate ordered vasoactive medications to optimize hemodynamic stability  - Assess quality of pulses, skin color and temperature  - Assess for signs of decreased coronary artery perfusion  - Instruct patient to report change in severity of symptoms  Outcome: Progressing  Goal: Absence of cardiac dysrhythmias or at baseline rhythm  Description: INTERVENTIONS:  - Continuous cardiac monitoring, vital signs, obtain 12 lead EKG if ordered  - Administer antiarrhythmic and heart rate control medications as ordered  - Monitor electrolytes and administer replacement therapy as ordered  Outcome: Progressing

## 2025-02-23 NOTE — RESPIRATORY THERAPY NOTE
RT Protocol Note  Armando Archer 65 y.o. male MRN: [id number]  Unit/Bed#: -01 Encounter: [id number]    Assessment    Principal Problem:    COPD with acute exacerbation (HCC)  Active Problems:    Elevated troponin    Morbid obesity due to excess calories (HCC)    Cardiomyopathy (HCC)      Home Pulmonary Medications:         Past Medical History:   Diagnosis Date    COPD (chronic obstructive pulmonary disease) (HCC)      Social History     Socioeconomic History    Marital status: /Civil Union     Spouse name: None    Number of children: None    Years of education: None    Highest education level: None   Occupational History    None   Tobacco Use    Smoking status: Former    Smokeless tobacco: Never   Substance and Sexual Activity    Alcohol use: Not Currently    Drug use: None    Sexual activity: None   Other Topics Concern    None   Social History Narrative    None     Social Drivers of Health     Financial Resource Strain: Not on file   Food Insecurity: No Food Insecurity (2/21/2025)    Hunger Vital Sign     Worried About Running Out of Food in the Last Year: Never true     Ran Out of Food in the Last Year: Never true   Transportation Needs: No Transportation Needs (2/21/2025)    PRAPARE - Transportation     Lack of Transportation (Medical): No     Lack of Transportation (Non-Medical): No   Physical Activity: Not on file   Stress: Not on file   Social Connections: Unknown (6/18/2024)    Received from [employer]     How often do you feel lonely or isolated from those around you? (Adult - for ages 18 years and over): Not on file   Intimate Partner Violence: Unknown (2/20/2025)    Nursing IPS     Feels Physically and Emotionally Safe: Not on file     Physically Hurt by Someone: Not on file     Humiliated or Emotionally Abused by Someone: Not on file     Physically Hurt by Someone: No     Hurt or Threatened by Someone: No   Housing Stability: Low Risk  (2/21/2025)    Housing Stability Vital  "Sign     Unable to Pay for Housing in the Last Year: No     Number of Times Moved in the Last Year: 0     Homeless in the Last Year: No       Subjective         Objective    Physical Exam:   Assessment Type: Pre-treatment  General Appearance: Alert, Awake  Respiratory Pattern: Normal  Chest Assessment: Chest expansion symmetrical  Bilateral Breath Sounds: Diminished  O2 Device: ra    Vitals:  Blood pressure 157/91, pulse 85, temperature 97.5 °F (36.4 °C), resp. rate 16, height 6' 1\" (1.854 m), weight (!) 152 kg (335 lb), SpO2 92%.          Imaging and other studies: Results Review Statement: No pertinent imaging studies reviewed.    O2 Device: ra     Plan    Respiratory Plan: Mild Distress pathway        Resp Comments: No distress. bs diminished. spo2 on room air 92%. continue tx's as ordered.   "

## 2025-02-24 ENCOUNTER — TELEPHONE (OUTPATIENT)
Dept: CARDIOLOGY CLINIC | Facility: CLINIC | Age: 65
End: 2025-02-24

## 2025-02-24 VITALS
RESPIRATION RATE: 20 BRPM | HEART RATE: 80 BPM | HEIGHT: 73 IN | SYSTOLIC BLOOD PRESSURE: 166 MMHG | OXYGEN SATURATION: 93 % | WEIGHT: 315 LBS | BODY MASS INDEX: 41.75 KG/M2 | DIASTOLIC BLOOD PRESSURE: 87 MMHG | TEMPERATURE: 98.6 F

## 2025-02-24 PROBLEM — R60.0 BILATERAL LOWER EXTREMITY EDEMA: Status: ACTIVE | Noted: 2025-02-24

## 2025-02-24 LAB
ANION GAP SERPL CALCULATED.3IONS-SCNC: 5 MMOL/L (ref 4–13)
BASOPHILS # BLD AUTO: 0.03 THOUSANDS/ÂΜL (ref 0–0.1)
BASOPHILS NFR BLD AUTO: 0 % (ref 0–1)
BUN SERPL-MCNC: 21 MG/DL (ref 5–25)
CALCIUM SERPL-MCNC: 8.8 MG/DL (ref 8.4–10.2)
CHLORIDE SERPL-SCNC: 102 MMOL/L (ref 96–108)
CO2 SERPL-SCNC: 31 MMOL/L (ref 21–32)
CREAT SERPL-MCNC: 0.68 MG/DL (ref 0.6–1.3)
EOSINOPHIL # BLD AUTO: 0.01 THOUSAND/ÂΜL (ref 0–0.61)
EOSINOPHIL NFR BLD AUTO: 0 % (ref 0–6)
ERYTHROCYTE [DISTWIDTH] IN BLOOD BY AUTOMATED COUNT: 14.5 % (ref 11.6–15.1)
GFR SERPL CREATININE-BSD FRML MDRD: 100 ML/MIN/1.73SQ M
GLUCOSE SERPL-MCNC: 142 MG/DL (ref 65–140)
HCT VFR BLD AUTO: 45.8 % (ref 36.5–49.3)
HGB BLD-MCNC: 15.4 G/DL (ref 12–17)
IMM GRANULOCYTES # BLD AUTO: 0.33 THOUSAND/UL (ref 0–0.2)
IMM GRANULOCYTES NFR BLD AUTO: 2 % (ref 0–2)
LYMPHOCYTES # BLD AUTO: 1.75 THOUSANDS/ÂΜL (ref 0.6–4.47)
LYMPHOCYTES NFR BLD AUTO: 10 % (ref 14–44)
MCH RBC QN AUTO: 29.2 PG (ref 26.8–34.3)
MCHC RBC AUTO-ENTMCNC: 33.6 G/DL (ref 31.4–37.4)
MCV RBC AUTO: 87 FL (ref 82–98)
MONOCYTES # BLD AUTO: 0.81 THOUSAND/ÂΜL (ref 0.17–1.22)
MONOCYTES NFR BLD AUTO: 5 % (ref 4–12)
NEUTROPHILS # BLD AUTO: 14.93 THOUSANDS/ÂΜL (ref 1.85–7.62)
NEUTS SEG NFR BLD AUTO: 83 % (ref 43–75)
NRBC BLD AUTO-RTO: 0 /100 WBCS
PLATELET # BLD AUTO: 204 THOUSANDS/UL (ref 149–390)
PMV BLD AUTO: 9.2 FL (ref 8.9–12.7)
POTASSIUM SERPL-SCNC: 4.2 MMOL/L (ref 3.5–5.3)
RBC # BLD AUTO: 5.27 MILLION/UL (ref 3.88–5.62)
SODIUM SERPL-SCNC: 138 MMOL/L (ref 135–147)
WBC # BLD AUTO: 17.86 THOUSAND/UL (ref 4.31–10.16)

## 2025-02-24 PROCEDURE — 99239 HOSP IP/OBS DSCHRG MGMT >30: CPT | Performed by: INTERNAL MEDICINE

## 2025-02-24 PROCEDURE — 4A023N8 MEASUREMENT OF CARDIAC SAMPLING AND PRESSURE, BILATERAL, PERCUTANEOUS APPROACH: ICD-10-PCS | Performed by: INTERNAL MEDICINE

## 2025-02-24 PROCEDURE — 99232 SBSQ HOSP IP/OBS MODERATE 35: CPT | Performed by: INTERNAL MEDICINE

## 2025-02-24 PROCEDURE — 85025 COMPLETE CBC W/AUTO DIFF WBC: CPT | Performed by: INTERNAL MEDICINE

## 2025-02-24 PROCEDURE — 94760 N-INVAS EAR/PLS OXIMETRY 1: CPT

## 2025-02-24 PROCEDURE — 80048 BASIC METABOLIC PNL TOTAL CA: CPT | Performed by: INTERNAL MEDICINE

## 2025-02-24 PROCEDURE — 94640 AIRWAY INHALATION TREATMENT: CPT

## 2025-02-24 RX ORDER — FUROSEMIDE 20 MG/1
20 TABLET ORAL DAILY
Qty: 30 TABLET | Refills: 0 | Status: SHIPPED | OUTPATIENT
Start: 2025-02-24

## 2025-02-24 RX ORDER — DOXYCYCLINE 100 MG/1
100 CAPSULE ORAL EVERY 12 HOURS
Qty: 2 CAPSULE | Refills: 0 | Status: SHIPPED | OUTPATIENT
Start: 2025-02-24 | End: 2025-02-25

## 2025-02-24 RX ORDER — CARVEDILOL 6.25 MG/1
6.25 TABLET ORAL 2 TIMES DAILY WITH MEALS
Qty: 60 TABLET | Refills: 0 | Status: SHIPPED | OUTPATIENT
Start: 2025-02-24

## 2025-02-24 RX ORDER — FUROSEMIDE 20 MG/1
20 TABLET ORAL DAILY
Status: DISCONTINUED | OUTPATIENT
Start: 2025-02-24 | End: 2025-02-24 | Stop reason: HOSPADM

## 2025-02-24 RX ORDER — LOSARTAN POTASSIUM 50 MG/1
50 TABLET ORAL DAILY
Status: DISCONTINUED | OUTPATIENT
Start: 2025-02-24 | End: 2025-02-24 | Stop reason: HOSPADM

## 2025-02-24 RX ORDER — PREDNISONE 20 MG/1
40 TABLET ORAL DAILY
Status: DISCONTINUED | OUTPATIENT
Start: 2025-02-25 | End: 2025-02-24 | Stop reason: HOSPADM

## 2025-02-24 RX ORDER — PREDNISONE 10 MG/1
TABLET ORAL
Qty: 30 TABLET | Refills: 0 | Status: SHIPPED | OUTPATIENT
Start: 2025-02-24 | End: 2025-03-08

## 2025-02-24 RX ORDER — LOSARTAN POTASSIUM 50 MG/1
50 TABLET ORAL DAILY
Qty: 30 TABLET | Refills: 0 | Status: SHIPPED | OUTPATIENT
Start: 2025-02-24

## 2025-02-24 RX ORDER — ASPIRIN 81 MG/1
81 TABLET, CHEWABLE ORAL DAILY
Qty: 30 TABLET | Refills: 0 | Status: SHIPPED | OUTPATIENT
Start: 2025-02-25

## 2025-02-24 RX ADMIN — ASPIRIN 81 MG 81 MG: 81 TABLET ORAL at 08:02

## 2025-02-24 RX ADMIN — SODIUM CHLORIDE 75 ML/HR: 0.9 INJECTION, SOLUTION INTRAVENOUS at 05:00

## 2025-02-24 RX ADMIN — LEVALBUTEROL HYDROCHLORIDE 1.25 MG: 1.25 SOLUTION RESPIRATORY (INHALATION) at 07:13

## 2025-02-24 RX ADMIN — DOXYCYCLINE HYCLATE 100 MG: 100 CAPSULE ORAL at 00:06

## 2025-02-24 RX ADMIN — METHYLPREDNISOLONE SODIUM SUCCINATE 40 MG: 40 INJECTION, POWDER, FOR SOLUTION INTRAMUSCULAR; INTRAVENOUS at 08:02

## 2025-02-24 RX ADMIN — LEVALBUTEROL HYDROCHLORIDE 1.25 MG: 1.25 SOLUTION RESPIRATORY (INHALATION) at 13:12

## 2025-02-24 RX ADMIN — ENOXAPARIN SODIUM 40 MG: 40 INJECTION SUBCUTANEOUS at 00:06

## 2025-02-24 RX ADMIN — BUDESONIDE 0.5 MG: 0.5 INHALANT ORAL at 07:13

## 2025-02-24 RX ADMIN — GUAIFENESIN 600 MG: 600 TABLET, EXTENDED RELEASE ORAL at 08:02

## 2025-02-24 RX ADMIN — CARVEDILOL 6.25 MG: 6.25 TABLET, FILM COATED ORAL at 08:02

## 2025-02-24 NOTE — PROGRESS NOTES
Cardiology Progress Note - Armando Archer 65 y.o. male MRN: [id number]    Unit/Bed#: -01 Encounter: [id number]      Assessment/Plan:  Assessment & Plan  COPD with acute exacerbation (HCC)  Management per primary team    Elevated troponin  EKG without acute ischemic changes.  Troponins minimally elevated.  Suspect his mildly elevated troponins are secondary to COPD exacerbation.    Recommend re-consideration of ischemic evaluation in the outpatient setting as noted below    Cardiomyopathy (HCC)  Newly diagnosed cardiomyopathy, etiology undifferentiated-EF 40%  He was planned to undergo LHC/RHC for evaluation of cardiomyopathy with known pulmonary history, however was unable to tolerate laying flat for the procedure.  He would strongly prefer to reassess cardiac evaluation in the outpatient setting.  GDMT: Continue Coreg 6.25 mg twice daily.  Begin losartan 50 mg daily.  BMP ordered to be completed in approximately 5 days.  Recommend reassessment with repeat echocardiogram versus ischemic evaluation in the outpatient setting.    Bilateral lower extremity edema  Begin Lasix 20 mg daily.  BMP ordered to be completed in approximately 5 days.    Morbid obesity due to excess calories (HCC)  Management per primary team      Cardiology will sign-off. Please reach out with any further questions or concerns.      Subjective:   Patient seen and examined.  He was initially scheduled to undergo cardiac LHC/RHC today, however experienced shortness of breath and coughing upon laying flat on the cath table and so has declined to undergo cardiac catheterization today.  He is very eager to go home, and would prefer to go home, recover from his current hospitalization, and reassess consideration for LHC/RHC in the outpatient setting.  He denies chest pain, palpitations, lightheadedness, syncopal episodes.  He is unsure if he has been experiencing stable versus worsening lower extremity edema, but does note dry weight of 330-335  "pounds.  He denies dyspnea on exertion.  He does feel that his breathing and coughing has overall improved since presentation to the hospital.    Objective:     Vitals: Blood pressure 166/87, pulse 80, temperature 98.6 °F (37 °C), temperature source Temporal, resp. rate 20, height 6' 1\" (1.854 m), weight (!) 152 kg (335 lb), SpO2 94%., Body mass index is 44.2 kg/m².,   Orthostatic Blood Pressures      Flowsheet Row Most Recent Value   Blood Pressure 166/87 filed at 02/24/2025 0837   Patient Position - Orthostatic VS Sitting filed at 02/23/2025 1518              Intake/Output Summary (Last 24 hours) at 2/24/2025 1206  Last data filed at 2/24/2025 0530  Gross per 24 hour   Intake 840 ml   Output 200 ml   Net 640 ml         Physical Exam:   GEN: Alert and oriented x 3, in no acute distress.  Well appearing and well nourished.  Obese body habitus.  HEENT: Sclera anicteric, conjunctivae pink, mucous membranes moist. Oropharynx clear.   NECK: Supple, no significant JVD. Trachea midline.   HEART: Regular rhythm, normal S1 and S2, no murmurs, clicks, gallops or rubs. PMI nondisplaced, no thrills.   LUNGS: Scattered wheezing to auscultation of bilateral lung fields.  No rales or rhonchi upon auscultation of bilateral lung fields.  No increased work of breathing or signs of respiratory distress.   ABDOMEN: Soft, nontender, non-distended.   EXTREMITIES: 1+ pitting edema to bilateral lower extremities.  Skin warm and well perfused, no clubbing, cyanosis.  NEURO: No focal findings. Normal speech. Mood and affect normal.   SKIN: Normal without suspicious lesions on exposed skin.      Telemetry:  Telemetry Orders (From admission, onward)               24 Hour Telemetry Monitoring  Continuous x 24 Hours (Telem)        Expiring   Question:  Reason for 24 Hour Telemetry  Answer:  PCI/EP study (including pacer and ICD implementation), Cardiac surgery, MI, abnormal cardiac cath, and chest pain- rule out MI                     Telemetry " Reviewed:  Sinus rhythm with underlying RBBB.    Medications:      Current Facility-Administered Medications:     [Transfer Hold] acetaminophen (TYLENOL) tablet 650 mg, 650 mg, Oral, Q6H PRN, Rolf Perez MD    aspirin chewable tablet 81 mg, 81 mg, Oral, Daily, Doreen Hutchinson PA-C, 81 mg at 02/24/25 0802    [Transfer Hold] budesonide (PULMICORT) inhalation solution 0.5 mg, 0.5 mg, Nebulization, Q12H, Francesco Holder MD, 0.5 mg at 02/24/25 0713    carvedilol (COREG) tablet 6.25 mg, 6.25 mg, Oral, BID With Meals, Doreen Hutchinson PA-C, 6.25 mg at 02/24/25 0802    [Transfer Hold] doxycycline hyclate (VIBRAMYCIN) capsule 100 mg, 100 mg, Oral, Q12H, Rolf Perez MD, 100 mg at 02/24/25 0006    [Transfer Hold] enoxaparin (LOVENOX) subcutaneous injection 40 mg, 40 mg, Subcutaneous, Q12H, Rolf Perez MD, 40 mg at 02/24/25 0006    furosemide (LASIX) tablet 20 mg, 20 mg, Oral, Daily, Doreen Hutchinson PA-C    [Transfer Hold] guaiFENesin (MUCINEX) 12 hr tablet 600 mg, 600 mg, Oral, BID, Rolf Perez MD, 600 mg at 02/24/25 0802    [Transfer Hold] levalbuterol (XOPENEX) inhalation solution 1.25 mg, 1.25 mg, Nebulization, TID, 1.25 mg at 02/24/25 0713 **AND** [DISCONTINUED] sodium chloride 0.9 % inhalation solution 3 mL, 3 mL, Nebulization, TID, Rolf Perez MD    losartan (COZAAR) tablet 50 mg, 50 mg, Oral, Daily, Doreen Hutchinson PA-C    [START ON 2/25/2025] predniSONE tablet 40 mg, 40 mg, Oral, Daily, Francesco Holder MD     Labs & Results:    Results from last 7 days   Lab Units 02/19/25  2105 02/19/25  1900 02/19/25  1655   HS TNI 0HR ng/L  --   --  52*   HS TNI 2HR ng/L  --  59*  --    HS TNI 4HR ng/L 64*  --   --    HSTNI D4 ng/L 12  --   --      Results from last 7 days   Lab Units 02/24/25  0451 02/23/25  0517 02/22/25  0627   WBC Thousand/uL 17.86* 17.64* 15.53*   HEMOGLOBIN g/dL 15.4 15.1 14.7   HEMATOCRIT % 45.8 45.5 44.7   PLATELETS Thousands/uL 204 197 175          Results from last 7 days   Lab Units 02/24/25  0451 02/23/25  0517 02/22/25  0627 02/20/25  0454 02/19/25  1655   POTASSIUM mmol/L 4.2 4.5 4.6   < > 4.1   CHLORIDE mmol/L 102 101 101   < > 105   CO2 mmol/L 31 30 32   < > 27   BUN mg/dL 21 28* 23   < > 23   CREATININE mg/dL 0.68 0.75 0.70   < > 0.78   CALCIUM mg/dL 8.8 9.0 9.1   < > 9.0   ALK PHOS U/L  --   --   --   --  55   ALT U/L  --   --   --   --  52   AST U/L  --   --   --   --  33    < > = values in this interval not displayed.               ** Please Note: Fluency DirectDictation voice to text software may have been used in the creation of this document. **

## 2025-02-24 NOTE — PROGRESS NOTES
Progress Note - Hospitalist   Name: Armando Archer 65 y.o. male I MRN: [id number]  Unit/Bed#: -01 I Date of Admission: 2/19/2025   Date of Service: 2/24/2025 I Hospital Day: 3    Assessment & Plan  COPD with acute exacerbation (HCC)  Recently on steroids and Levaquin  [facility]  Completed doxycycline therapy  Clinically improved IV steroids  Transition to p.o. prednisone taper  Elevated troponin  Mildly elevated troponin, no chest pain.  Appreciate cardiology evaluation  Plan as outlined under cardiomyopathy  Morbid obesity due to excess calories (HCC)  As noted by BMI of 44  Diet exercise counseling provided  Cardiomyopathy (HCC)  Noted to have a EF of 40% new onset  Plan for cardiac cath today  Follow-up results    VTE Pharmacologic Prophylaxis: VTE Score: 3 Moderate Risk (Score 3-4) - Pharmacological DVT Prophylaxis Ordered: enoxaparin (Lovenox).    Mobility:   Basic Mobility Inpatient Raw Score: 24  JH-HLM Goal: 8: Walk 250 feet or more  JH-HLM Achieved: 8: Walk 250 feet ot more  JH-HLM Goal achieved. Continue to encourage appropriate mobility.    Patient Centered Rounds: I performed bedside rounds with nursing staff today.   Discussions with Specialists or Other Care Team Provider: cm, nursing    Education and Discussions with Family / Patient: Patient declined call to .     Current Length of Stay: 3 day(s)  Current Patient Status: Inpatient   Certification Statement: The patient will continue to require additional inpatient hospital stay due to see below  Discharge Plan:  Pending cardiac cath possible discharge later today    Code Status: Level 1 - Full Code    Subjective   Denies chest pain, shortness breath, cough.  Reports breathing improved    Objective :  Temp:  [97.2 °F (36.2 °C)-98.6 °F (37 °C)] 98.6 °F (37 °C)  HR:  [] 80  BP: (127-166)/(76-97) 166/87  Resp:  [18-20] 20  SpO2:  [90 %-96 %] 94 %  O2 Device: None (Room air)    Body mass index is 44.2 kg/m².      Input and Output Summary (last 24 hours):     Intake/Output Summary (Last 24 hours) at 2/24/2025 1053  Last data filed at 2/24/2025 0530  Gross per 24 hour   Intake 840 ml   Output 200 ml   Net 640 ml       Physical Exam  Constitutional:       General: He is not in acute distress.     Appearance: He is well-developed. He is not diaphoretic.   HENT:      Head: Normocephalic and atraumatic.      Nose: Nose normal.      Mouth/Throat:      Pharynx: No oropharyngeal exudate.   Eyes:      General: No scleral icterus.     Conjunctiva/sclera: Conjunctivae normal.   Cardiovascular:      Rate and Rhythm: Normal rate and regular rhythm.      Heart sounds: Normal heart sounds. No murmur heard.     No friction rub. No gallop.   Pulmonary:      Effort: Pulmonary effort is normal. No respiratory distress.      Breath sounds: Normal breath sounds. No wheezing or rales.   Chest:      Chest wall: No tenderness.   Abdominal:      General: Bowel sounds are normal. There is no distension.      Palpations: Abdomen is soft.      Tenderness: There is no abdominal tenderness. There is no guarding.   Musculoskeletal:         General: No tenderness or deformity. Normal range of motion.      Cervical back: Normal range of motion and neck supple.   Skin:     General: Skin is warm and dry.      Findings: No erythema.   Neurological:      Mental Status: He is alert. Mental status is at baseline.           Lines/Drains:        Telemetry:  Telemetry Orders (From admission, onward)               24 Hour Telemetry Monitoring  Continuous x 24 Hours (Telem)        Expiring   Question:  Reason for 24 Hour Telemetry  Answer:  PCI/EP study (including pacer and ICD implementation), Cardiac surgery, MI, abnormal cardiac cath, and chest pain- rule out MI                     Telemetry Reviewed: Normal Sinus Rhythm  Indication for Continued Telemetry Use: No indication for continued use. Will discontinue.                Lab Results: I have reviewed the  following results:   Results from last 7 days   Lab Units 02/24/25  0451   WBC Thousand/uL 17.86*   HEMOGLOBIN g/dL 15.4   HEMATOCRIT % 45.8   PLATELETS Thousands/uL 204   SEGS PCT % 83*   LYMPHO PCT % 10*   MONO PCT % 5   EOS PCT % 0     Results from last 7 days   Lab Units 02/24/25  0451 02/20/25  0454 02/19/25  1655   SODIUM mmol/L 138   < > 139   POTASSIUM mmol/L 4.2   < > 4.1   CHLORIDE mmol/L 102   < > 105   CO2 mmol/L 31   < > 27   BUN mg/dL 21   < > 23   CREATININE mg/dL 0.68   < > 0.78   ANION GAP mmol/L 5   < > 7   CALCIUM mg/dL 8.8   < > 9.0   ALBUMIN g/dL  --   --  4.2   TOTAL BILIRUBIN mg/dL  --   --  0.37   ALK PHOS U/L  --   --  55   ALT U/L  --   --  52   AST U/L  --   --  33   GLUCOSE RANDOM mg/dL 142*   < > 145*    < > = values in this interval not displayed.                       Recent Cultures (last 7 days):         Imaging Results Review: I personally reviewed the following image studies/reports in PACS and discussed pertinent findings with Radiology: chest xray. My interpretation of the radiology images/reports is:  .  Other Study Results Review: EKG was reviewed.     Last 24 Hours Medication List:     Current Facility-Administered Medications:     [Transfer Hold] acetaminophen (TYLENOL) tablet 650 mg, Q6H PRN    [Transfer Hold] aspirin chewable tablet 81 mg, Daily    [Transfer Hold] budesonide (PULMICORT) inhalation solution 0.5 mg, Q12H    [Transfer Hold] carvedilol (COREG) tablet 6.25 mg, BID With Meals    [Transfer Hold] doxycycline hyclate (VIBRAMYCIN) capsule 100 mg, Q12H    [Transfer Hold] enoxaparin (LOVENOX) subcutaneous injection 40 mg, Q12H    [Transfer Hold] guaiFENesin (MUCINEX) 12 hr tablet 600 mg, BID    [Transfer Hold] levalbuterol (XOPENEX) inhalation solution 1.25 mg, TID **AND** [DISCONTINUED] sodium chloride 0.9 % inhalation solution 3 mL, TID    [START ON 2/25/2025] predniSONE tablet 40 mg, Daily    Administrative Statements   Today, Patient Was Seen By: Francesco Holder,  MD NUGENT have spent a total time of 30 minutes in caring for this patient on the day of the visit/encounter including Diagnostic results.    **Please Note: This note may have been constructed using a voice recognition system.**

## 2025-02-24 NOTE — ASSESSMENT & PLAN NOTE
Medication refilled for #90. Please notify patient or spouse. Patient is overdue for physical/follow-up. Please schedule. Mildly elevated troponin, no chest pain.  Appreciate cardiology evaluation  Plan as outlined under cardiomyopathy

## 2025-02-24 NOTE — ASSESSMENT & PLAN NOTE
Recently on steroids and Levaquin  Patient followed by [facility] pulmonology  Completed doxycycline therapy  Clinically improved IV steroids  Transition to p.o. prednisone taper

## 2025-02-24 NOTE — TELEPHONE ENCOUNTER
----- Message from Nohemi Hicks PA-C sent at 2/21/2025  2:19 PM EST -----  Regarding: [facility]  Cardiology Follow-up:    Patient clinical visit in 7-8 week at the Cardio location: [place] office. .    Schedule visit with [facility] Providers: first available provider, nohemi leigh    Type of Visit: VISIT TYPE: in-person office visit.    Test ordered: Cardiac tests: stress test.    Additional Details:

## 2025-02-24 NOTE — PLAN OF CARE
Problem: PAIN - ADULT  Goal: Verbalizes/displays adequate comfort level or baseline comfort level  Description: Interventions:  - Encourage patient to monitor pain and request assistance  - Assess pain using appropriate pain scale  - Administer analgesics based on type and severity of pain and evaluate response  - Implement non-pharmacological measures as appropriate and evaluate response  - Consider cultural and social influences on pain and pain management  - Notify physician/advanced practitioner if interventions unsuccessful or patient reports new pain  Outcome: Progressing     Problem: INFECTION - ADULT  Goal: Absence or prevention of progression during hospitalization  Description: INTERVENTIONS:  - Assess and monitor for signs and symptoms of infection  - Monitor lab/diagnostic results  - Monitor all insertion sites, i.e. indwelling lines, tubes, and drains  - Monitor endotracheal if appropriate and nasal secretions for changes in amount and color  - [place] appropriate cooling/warming therapies per order  - Administer medications as ordered  - Instruct and encourage patient and family to use good hand hygiene technique  - Identify and instruct in appropriate isolation precautions for identified infection/condition  Outcome: Progressing  Goal: Absence of fever/infection during neutropenic period  Description: INTERVENTIONS:  - Monitor WBC    Outcome: Progressing     Problem: CARDIOVASCULAR - ADULT  Goal: Maintains optimal cardiac output and hemodynamic stability  Description: INTERVENTIONS:  - Monitor I/O, vital signs and rhythm  - Monitor for S/S and trends of decreased cardiac output  - Administer and titrate ordered vasoactive medications to optimize hemodynamic stability  - Assess quality of pulses, skin color and temperature  - Assess for signs of decreased coronary artery perfusion  - Instruct patient to report change in severity of symptoms  Outcome: Progressing  Goal: Absence of cardiac  dysrhythmias or at baseline rhythm  Description: INTERVENTIONS:  - Continuous cardiac monitoring, vital signs, obtain 12 lead EKG if ordered  - Administer antiarrhythmic and heart rate control medications as ordered  - Monitor electrolytes and administer replacement therapy as ordered  Outcome: Progressing

## 2025-02-24 NOTE — DISCHARGE SUMMARY
Discharge Summary - Hospitalist   Name: Armando Archer 65 y.o. male I MRN: [id number]  Unit/Bed#: -01 I Date of Admission: 2/19/2025   Date of Service: 2/24/2025 I Hospital Day: 3     Assessment & Plan  COPD with acute exacerbation (HCC)  Recently on steroids and Levaquin  [facility]  Completed doxycycline therapy  Clinically improved IV steroids  Transition to p.o. prednisone taper  Elevated troponin  Mildly elevated troponin, no chest pain.  Appreciate cardiology evaluation  Plan as outlined under cardiomyopathy  Morbid obesity due to excess calories (HCC)  As noted by BMI of 44  Diet exercise counseling provided  Cardiomyopathy (HCC)  Noted to have a EF of 40% new onset  Plan for cardiac cath today however refused will have outpatient follow-up with cardiology     Discharging Physician / Practitioner: Francesco Holder MD  PCP: Randal Mari MD  Admission Date:   Admission Orders (From admission, onward)       Ordered        02/21/25 1015  INPATIENT ADMISSION  Once            02/19/25 2043  Place in Observation  Once                          Discharge Date: 02/24/25    Medical Problems       Resolved Problems  Date Reviewed: 2/24/2025   None         Consultations During Hospital Stay:  cardiology    Procedures Performed:   none    Significant Findings / Test Results:   XR chest 1 view portable  Result Date: 2/20/2025  Impression: Mild prominence of the heart. Clear lungs Workstation performed: [id number]     CTA chest pe study  Result Date: 2/19/2025  Impression: Limited CTA chest demonstrates no intraluminal filling defect to suggest an acute pulmonary embolus. However, evaluation for small distal subsegmental emboli was limited. Poor inspiratory effort with scattered bilateral atelectatic lung changes. A few tree-in-bud opacities are noted at the bilateral lung bases with mild bronchial wall thickening suspicious for an infectious or inflammatory bronchiolitis. No pleural  "effusion or pneumothorax. Mild emphysematous lung changes. Mild right hilar lymphadenopathy likely reactive. Consider a repeat CT chest in 2 to 3 months to assess for stability or improvement. Workstation performed: [id number]      Incidental Findings:   none     Test Results Pending at Discharge (will require follow up):   none     Outpatient Tests Requested:  none    Complications:  none    Reason for Admission: COPD exacerbation    Hospital Course:     Armando Archer is a 65 y.o. male patient who originally presented to the hospital on 2/19/2025 with past medical history significant obesity initially presented with COPD exacerbation noted to have EF of 40%.  Was treated with IV steroids with significant improvement ultimately transition to p.o. steroids.  Declined cardiac catheterization will have outpatient follow-up with cardiology      Please see above list of diagnoses and related plan for additional information.     Condition at Discharge: stable     Discharge Day Visit / Exam:     Subjective: Denies chest pain, shortness breath, cough, fevers, chills  Vitals: Blood Pressure: 166/87 (02/24/25 0837)  Pulse: 80 (02/24/25 0837)  Temperature: 98.6 °F (37 °C) (02/24/25 0837)  Temp Source: Temporal (02/24/25 0837)  Respirations: 20 (02/24/25 0837)  Height: 6' 1\" (185.4 cm) (02/21/25 1435)  Weight - Scale: (!) 152 kg (335 lb) (02/21/25 1435)  SpO2: 94 % (02/24/25 0837)  Exam:   Physical Exam  Constitutional:       General: He is not in acute distress.     Appearance: He is well-developed. He is not diaphoretic.   HENT:      Head: Normocephalic and atraumatic.      Nose: Nose normal.      Mouth/Throat:      Pharynx: No oropharyngeal exudate.   Eyes:      General: No scleral icterus.     Conjunctiva/sclera: Conjunctivae normal.   Cardiovascular:      Rate and Rhythm: Normal rate and regular rhythm.      Heart sounds: Normal heart sounds. No murmur heard.     No friction rub. No gallop.   Pulmonary:      Effort: Pulmonary " effort is normal. No respiratory distress.      Breath sounds: Normal breath sounds. No wheezing or rales.   Chest:      Chest wall: No tenderness.   Abdominal:      General: Bowel sounds are normal. There is no distension.      Palpations: Abdomen is soft.      Tenderness: There is no abdominal tenderness. There is no guarding.   Musculoskeletal:         General: No tenderness or deformity. Normal range of motion.      Cervical back: Normal range of motion and neck supple.   Skin:     General: Skin is warm and dry.      Findings: No erythema.   Neurological:      Mental Status: He is alert. Mental status is at baseline.       (   Discussion with Family: pt, wife at bedside    Discharge instructions/Information to patient and family:   See after visit summary for information provided to patient and family.      Provisions for Follow-Up Care:  See after visit summary for information related to follow-up care and any pertinent home health orders.      Disposition:     Home    For Discharges to [facility] SNF:   Not Applicable to this Patient - Not Applicable to this Patient    Planned Readmission: none     Discharge Statement:  I spent 60 minutes discharging the patient. This time was spent on the day of discharge. I had direct contact with the patient on the day of discharge. Greater than 50% of the total time was spent examining patient, answering all patient questions, arranging and discussing plan of care with patient as well as directly providing post-discharge instructions.  Additional time then spent on discharge activities.    Discharge Medications:  See after visit summary for reconciled discharge medications provided to patient and family.      ** Please Note: This note has been constructed using a voice recognition system **

## 2025-02-24 NOTE — ASSESSMENT & PLAN NOTE
EKG without acute ischemic changes.  Troponins minimally elevated.  Suspect his mildly elevated troponins are secondary to COPD exacerbation.    Recommend re-consideration of ischemic evaluation in the outpatient setting as noted below

## 2025-02-24 NOTE — ASSESSMENT & PLAN NOTE
Noted to have a EF of 40% new onset  Plan for cardiac cath today however refused will have outpatient follow-up with cardiology

## 2025-02-24 NOTE — CASE MANAGEMENT
Case Management Discharge Planning Note    Patient name Armando Archer  Location /-01 MRN [id number]  : 1960 Date 2025       Current Admission Date: 2025  Current Admission Diagnosis:COPD with acute exacerbation (HCC)   Patient Active Problem List    Diagnosis Date Noted Date Diagnosed    Morbid obesity due to excess calories (HCC) 2025     Cardiomyopathy (HCC) 2025     COPD with acute exacerbation (HCC) 2025     Elevated troponin 2025     Basal cell carcinoma of left side of neck 2016       LOS (days): 3  Geometric Mean LOS (GMLOS) (days): 2.7  Days to GMLOS:-0.4     OBJECTIVE:  Risk of Unplanned Readmission Score: 9.14         Current admission status: Inpatient   Preferred Pharmacy:   [id number] [address] [place] [id number]  Phone: [phone] Fax: [phone]    [email or web address] [place] [place] [facility] [address]  [address]  [address]  [facility] [address]  Phone: [phone] Fax: [phone]    Primary Care Provider: Randal Mari MD    Primary Insurance: [email or web address]  Secondary Insurance:     DISCHARGE DETAILS:  As per SLIM rounds, patient refused cardiac cath. Being discharged today to follow up with outpatient cardiology and stress test. No CM needs.

## 2025-02-24 NOTE — PLAN OF CARE
Problem: Potential for Falls  Goal: Patient will remain free of falls  Description: INTERVENTIONS:  - Educate patient/family on patient safety including physical limitations  - Instruct patient to call for assistance with activity   - Consult OT/PT to assist with strengthening/mobility   - Keep Call bell within reach  - Keep bed low and locked with side rails adjusted as appropriate  - Keep care items and personal belongings within reach  - Initiate and maintain comfort rounds  - Make Fall Risk Sign visible to staff  - Offer Toileting every 2 Hours, in advance of need  - Initiate/Maintain bed alarm  - Obtain necessary fall risk management equipment: call bell within reach   - Apply yellow socks and bracelet for high fall risk patients  - Consider moving patient to room near nurses station  Outcome: Progressing     Problem: PAIN - ADULT  Goal: Verbalizes/displays adequate comfort level or baseline comfort level  Description: Interventions:  - Encourage patient to monitor pain and request assistance  - Assess pain using appropriate pain scale  - Administer analgesics based on type and severity of pain and evaluate response  - Implement non-pharmacological measures as appropriate and evaluate response  - Consider cultural and social influences on pain and pain management  - Notify physician/advanced practitioner if interventions unsuccessful or patient reports new pain  Outcome: Progressing     Problem: INFECTION - ADULT  Goal: Absence or prevention of progression during hospitalization  Description: INTERVENTIONS:  - Assess and monitor for signs and symptoms of infection  - Monitor lab/diagnostic results  - Monitor all insertion sites, i.e. indwelling lines, tubes, and drains  - Monitor endotracheal if appropriate and nasal secretions for changes in amount and color  - [place] appropriate cooling/warming therapies per order  - Administer medications as ordered  - Instruct and encourage patient and family to use  good hand hygiene technique  - Identify and instruct in appropriate isolation precautions for identified infection/condition  Outcome: Progressing  Goal: Absence of fever/infection during neutropenic period  Description: INTERVENTIONS:  - Monitor WBC    Outcome: Progressing     Problem: SAFETY ADULT  Goal: Patient will remain free of falls  Description: INTERVENTIONS:  - Educate patient/family on patient safety including physical limitations  - Instruct patient to call for assistance with activity   - Consult OT/PT to assist with strengthening/mobility   - Keep Call bell within reach  - Keep bed low and locked with side rails adjusted as appropriate  - Keep care items and personal belongings within reach  - Initiate and maintain comfort rounds  - Make Fall Risk Sign visible to staff  - Offer Toileting every 2 Hours, in advance of need  - Initiate/Maintain bed alarm  - Obtain necessary fall risk management equipment: call bell within reach   - Apply yellow socks and bracelet for high fall risk patients  - Consider moving patient to room near nurses station  Outcome: Progressing  Goal: Maintain or return to baseline ADL function  Description: INTERVENTIONS:  -  Assess patient's ability to carry out ADLs; assess patient's baseline for ADL function and identify physical deficits which impact ability to perform ADLs (bathing, care of mouth/teeth, toileting, grooming, dressing, etc.)  - Assess/evaluate cause of self-care deficits   - Assess range of motion  - Assess patient's mobility; develop plan if impaired  - Assess patient's need for assistive devices and provide as appropriate  - Encourage maximum independence but intervene and supervise when necessary  - Involve family in performance of ADLs  - Assess for home care needs following discharge   - Consider OT consult to assist with ADL evaluation and planning for discharge  - Provide patient education as appropriate  Outcome: Progressing  Goal: Maintains/Returns to  pre admission functional level  Description: INTERVENTIONS:  - Perform AM-PAC 6 Click Basic Mobility/ Daily Activity assessment daily.  - Set and communicate daily mobility goal to care team and patient/family/caregiver.   - Collaborate with rehabilitation services on mobility goals if consulted  - Perform Range of Motion 4 times a day.  - Reposition patient every 2 hours.  - Dangle patient 3 times a day  - Stand patient 3 times a day  - Ambulate patient 3 times a day  - Out of bed to chair 3 times a day   - Out of bed for meals 3 times a day  - Out of bed for toileting  - Record patient progress and toleration of activity level   Outcome: Progressing     Problem: DISCHARGE PLANNING  Goal: Discharge to home or other facility with appropriate resources  Description: INTERVENTIONS:  - Identify barriers to discharge w/patient and caregiver  - Arrange for needed discharge resources and transportation as appropriate  - Identify discharge learning needs (meds, wound care, etc.)  - Arrange for interpretive services to assist at discharge as needed  - Refer to Case Management Department for coordinating discharge planning if the patient needs post-hospital services based on physician/advanced practitioner order or complex needs related to functional status, cognitive ability, or social support system  Outcome: Progressing     Problem: Knowledge Deficit  Goal: Patient/family/caregiver demonstrates understanding of disease process, treatment plan, medications, and discharge instructions  Description: Complete learning assessment and assess knowledge base.  Interventions:  - Provide teaching at level of understanding  - Provide teaching via preferred learning methods  Outcome: Progressing     Problem: RESPIRATORY - ADULT  Goal: Achieves optimal ventilation and oxygenation  Description: INTERVENTIONS:  - Assess for changes in respiratory status  - Assess for changes in mentation and behavior  - Position to facilitate  oxygenation and minimize respiratory effort  - Oxygen administered by appropriate delivery if ordered  - Initiate smoking cessation education as indicated  - Encourage broncho-pulmonary hygiene including cough, deep breathe, Incentive Spirometry  - Assess the need for suctioning and aspirate as needed  - Assess and instruct to report SOB or any respiratory difficulty  - Respiratory Therapy support as indicated  Outcome: Progressing     Problem: CARDIOVASCULAR - ADULT  Goal: Maintains optimal cardiac output and hemodynamic stability  Description: INTERVENTIONS:  - Monitor I/O, vital signs and rhythm  - Monitor for S/S and trends of decreased cardiac output  - Administer and titrate ordered vasoactive medications to optimize hemodynamic stability  - Assess quality of pulses, skin color and temperature  - Assess for signs of decreased coronary artery perfusion  - Instruct patient to report change in severity of symptoms  Outcome: Progressing  Goal: Absence of cardiac dysrhythmias or at baseline rhythm  Description: INTERVENTIONS:  - Continuous cardiac monitoring, vital signs, obtain 12 lead EKG if ordered  - Administer antiarrhythmic and heart rate control medications as ordered  - Monitor electrolytes and administer replacement therapy as ordered  Outcome: Progressing

## 2025-02-24 NOTE — ASSESSMENT & PLAN NOTE
Newly diagnosed cardiomyopathy, etiology undifferentiated-EF 40%  He was planned to undergo LHC/RHC for evaluation of cardiomyopathy with known pulmonary history, however was unable to tolerate laying flat for the procedure.  He would strongly prefer to reassess cardiac evaluation in the outpatient setting.  GDMT: Continue Coreg 6.25 mg twice daily.  Begin losartan 50 mg daily.  BMP ordered to be completed in approximately 5 days.  Recommend reassessment with repeat echocardiogram versus ischemic evaluation in the outpatient setting.

## 2025-02-24 NOTE — TELEPHONE ENCOUNTER
----- Message from Doreen Hutchinson PA-C sent at 2/24/2025 12:05 PM EST -----  Regarding: Hospital Follow-Up  Cardiology Follow-up:    Patient clinical visit in 2-3 weeks at the Cardio location: [place] office. .    Schedule visit with Jase Godinez or first available AP.    Type of Visit: VISIT TYPE: in-person office visit.    Test ordered: Cardiac tests: lab work.

## (undated) DEVICE — SWAN-GANZ DOUBLE LUMEN MONITORING CATHETER: Brand: SWAN-GANZ

## (undated) DEVICE — DGW .035 FC J3MM 260CM TEF: Brand: EMERALD

## (undated) DEVICE — CATH DIAG 5FR .045 100CM FR4

## (undated) DEVICE — GLIDESHEATH SLENDER STAINLESS STEEL KIT: Brand: GLIDESHEATH SLENDER

## (undated) DEVICE — CATH DIAG 5FR IMPULSE 100CM FL3.5